# Patient Record
Sex: MALE | Race: WHITE | Employment: OTHER | URBAN - METROPOLITAN AREA
[De-identification: names, ages, dates, MRNs, and addresses within clinical notes are randomized per-mention and may not be internally consistent; named-entity substitution may affect disease eponyms.]

---

## 2017-01-08 ENCOUNTER — APPOINTMENT (OUTPATIENT)
Dept: GENERAL RADIOLOGY | Age: 82
DRG: 871 | End: 2017-01-08
Attending: EMERGENCY MEDICINE
Payer: MEDICARE

## 2017-01-08 ENCOUNTER — HOSPITAL ENCOUNTER (INPATIENT)
Age: 82
LOS: 2 days | Discharge: NURSING, REHAB OR LONG-TERM CARE | DRG: 871 | End: 2017-01-11
Attending: EMERGENCY MEDICINE | Admitting: INTERNAL MEDICINE
Payer: MEDICARE

## 2017-01-08 ENCOUNTER — APPOINTMENT (OUTPATIENT)
Dept: CT IMAGING | Age: 82
DRG: 871 | End: 2017-01-08
Attending: EMERGENCY MEDICINE
Payer: MEDICARE

## 2017-01-08 DIAGNOSIS — J21.8 ACUTE VIRAL BRONCHIOLITIS: ICD-10-CM

## 2017-01-08 DIAGNOSIS — J96.01 ACUTE RESPIRATORY FAILURE WITH HYPOXEMIA (HCC): ICD-10-CM

## 2017-01-08 DIAGNOSIS — E11.9 DIABETES MELLITUS TYPE 2, INSULIN DEPENDENT (HCC): ICD-10-CM

## 2017-01-08 DIAGNOSIS — J96.01 ACUTE RESPIRATORY FAILURE WITH HYPOXIA (HCC): ICD-10-CM

## 2017-01-08 DIAGNOSIS — J10.1 INFLUENZA A: Primary | ICD-10-CM

## 2017-01-08 DIAGNOSIS — I48.91 ATRIAL FIBRILLATION WITH RVR (HCC): ICD-10-CM

## 2017-01-08 DIAGNOSIS — Z79.4 DIABETES MELLITUS TYPE 2, INSULIN DEPENDENT (HCC): ICD-10-CM

## 2017-01-08 DIAGNOSIS — A41.9 SEVERE SEPSIS (HCC): ICD-10-CM

## 2017-01-08 DIAGNOSIS — B97.89 ACUTE VIRAL BRONCHIOLITIS: ICD-10-CM

## 2017-01-08 DIAGNOSIS — R65.20 SEVERE SEPSIS (HCC): ICD-10-CM

## 2017-01-08 LAB
ALBUMIN SERPL BCP-MCNC: 3 G/DL (ref 3.2–4.6)
ALBUMIN/GLOB SERPL: 0.9 {RATIO} (ref 1.2–3.5)
ALP SERPL-CCNC: 68 U/L (ref 50–136)
ALT SERPL-CCNC: 34 U/L (ref 12–65)
AMORPH CRY URNS QL MICRO: NORMAL
ANION GAP BLD CALC-SCNC: 9 MMOL/L (ref 7–16)
AST SERPL W P-5'-P-CCNC: 31 U/L (ref 15–37)
BACTERIA URNS QL MICRO: NORMAL /HPF
BASOPHILS # BLD AUTO: 0 K/UL (ref 0–0.2)
BASOPHILS # BLD: 0 % (ref 0–2)
BILIRUB SERPL-MCNC: 0.4 MG/DL (ref 0.2–1.1)
BUN SERPL-MCNC: 18 MG/DL (ref 8–23)
CALCIUM SERPL-MCNC: 8.5 MG/DL (ref 8.3–10.4)
CASTS URNS QL MICRO: NORMAL /LPF
CHLORIDE SERPL-SCNC: 102 MMOL/L (ref 98–107)
CO2 SERPL-SCNC: 25 MMOL/L (ref 21–32)
CREAT SERPL-MCNC: 1.47 MG/DL (ref 0.8–1.5)
CRYSTALS URNS QL MICRO: 0 /LPF
DIFFERENTIAL METHOD BLD: ABNORMAL
EOSINOPHIL # BLD: 0 K/UL (ref 0–0.8)
EOSINOPHIL NFR BLD: 0 % (ref 0.5–7.8)
EPI CELLS #/AREA URNS HPF: NORMAL /HPF
ERYTHROCYTE [DISTWIDTH] IN BLOOD BY AUTOMATED COUNT: 13.6 % (ref 11.9–14.6)
FLUAV AG NPH QL IA: POSITIVE
FLUBV AG NPH QL IA: NEGATIVE
GLOBULIN SER CALC-MCNC: 3.3 G/DL (ref 2.3–3.5)
GLUCOSE SERPL-MCNC: 132 MG/DL (ref 65–100)
HCT VFR BLD AUTO: 38.6 % (ref 41.1–50.3)
HGB BLD-MCNC: 12.8 G/DL (ref 13.6–17.2)
IMM GRANULOCYTES # BLD: 0 K/UL (ref 0–0.5)
IMM GRANULOCYTES NFR BLD AUTO: 0.4 % (ref 0–5)
INR PPP: 1.1 (ref 0.9–1.2)
LYMPHOCYTES # BLD AUTO: 7 % (ref 13–44)
LYMPHOCYTES # BLD: 0.8 K/UL (ref 0.5–4.6)
MCH RBC QN AUTO: 29.3 PG (ref 26.1–32.9)
MCHC RBC AUTO-ENTMCNC: 33.2 G/DL (ref 31.4–35)
MCV RBC AUTO: 88.3 FL (ref 79.6–97.8)
MONOCYTES # BLD: 1 K/UL (ref 0.1–1.3)
MONOCYTES NFR BLD AUTO: 10 % (ref 4–12)
MUCOUS THREADS URNS QL MICRO: 0 /LPF
NEUTS SEG # BLD: 9.1 K/UL (ref 1.7–8.2)
NEUTS SEG NFR BLD AUTO: 83 % (ref 43–78)
PLATELET # BLD AUTO: 160 K/UL (ref 150–450)
PMV BLD AUTO: 8.8 FL (ref 10.8–14.1)
POTASSIUM SERPL-SCNC: 3.9 MMOL/L (ref 3.5–5.1)
PROT SERPL-MCNC: 6.3 G/DL (ref 6.3–8.2)
PROTHROMBIN TIME: 12.5 SEC (ref 9.6–12)
RBC # BLD AUTO: 4.37 M/UL (ref 4.23–5.67)
RBC #/AREA URNS HPF: NORMAL /HPF
SODIUM SERPL-SCNC: 136 MMOL/L (ref 136–145)
WBC # BLD AUTO: 10.9 K/UL (ref 4.3–11.1)
WBC URNS QL MICRO: NORMAL /HPF

## 2017-01-08 PROCEDURE — 80053 COMPREHEN METABOLIC PANEL: CPT | Performed by: EMERGENCY MEDICINE

## 2017-01-08 PROCEDURE — 74011250636 HC RX REV CODE- 250/636: Performed by: EMERGENCY MEDICINE

## 2017-01-08 PROCEDURE — 96374 THER/PROPH/DIAG INJ IV PUSH: CPT | Performed by: EMERGENCY MEDICINE

## 2017-01-08 PROCEDURE — 81015 MICROSCOPIC EXAM OF URINE: CPT | Performed by: EMERGENCY MEDICINE

## 2017-01-08 PROCEDURE — 87804 INFLUENZA ASSAY W/OPTIC: CPT | Performed by: EMERGENCY MEDICINE

## 2017-01-08 PROCEDURE — 77030011943: Performed by: EMERGENCY MEDICINE

## 2017-01-08 PROCEDURE — 87040 BLOOD CULTURE FOR BACTERIA: CPT | Performed by: EMERGENCY MEDICINE

## 2017-01-08 PROCEDURE — 83605 ASSAY OF LACTIC ACID: CPT

## 2017-01-08 PROCEDURE — 85610 PROTHROMBIN TIME: CPT | Performed by: EMERGENCY MEDICINE

## 2017-01-08 PROCEDURE — 85025 COMPLETE CBC W/AUTO DIFF WBC: CPT | Performed by: EMERGENCY MEDICINE

## 2017-01-08 PROCEDURE — 93005 ELECTROCARDIOGRAM TRACING: CPT | Performed by: INTERNAL MEDICINE

## 2017-01-08 PROCEDURE — 71020 XR CHEST PA LAT: CPT

## 2017-01-08 PROCEDURE — 70450 CT HEAD/BRAIN W/O DYE: CPT

## 2017-01-08 PROCEDURE — 51701 INSERT BLADDER CATHETER: CPT | Performed by: EMERGENCY MEDICINE

## 2017-01-08 PROCEDURE — 99285 EMERGENCY DEPT VISIT HI MDM: CPT | Performed by: EMERGENCY MEDICINE

## 2017-01-08 PROCEDURE — 84145 PROCALCITONIN (PCT): CPT | Performed by: EMERGENCY MEDICINE

## 2017-01-08 PROCEDURE — 96361 HYDRATE IV INFUSION ADD-ON: CPT | Performed by: EMERGENCY MEDICINE

## 2017-01-08 RX ORDER — GLUCOSAMINE SULFATE 1500 MG
POWDER IN PACKET (EA) ORAL
COMMUNITY
End: 2017-02-07

## 2017-01-08 RX ORDER — INSULIN GLARGINE 100 [IU]/ML
24 INJECTION, SOLUTION SUBCUTANEOUS
COMMUNITY

## 2017-01-08 RX ORDER — BACLOFEN 20 MG
250 TABLET ORAL
COMMUNITY

## 2017-01-08 RX ORDER — IPRATROPIUM BROMIDE AND ALBUTEROL SULFATE 2.5; .5 MG/3ML; MG/3ML
3 SOLUTION RESPIRATORY (INHALATION)
Status: COMPLETED | OUTPATIENT
Start: 2017-01-08 | End: 2017-01-09

## 2017-01-08 RX ORDER — INSULIN LISPRO 100 [IU]/ML
5 INJECTION, SOLUTION INTRAVENOUS; SUBCUTANEOUS
COMMUNITY

## 2017-01-08 RX ORDER — ACETAMINOPHEN 325 MG/1
500 TABLET ORAL
COMMUNITY

## 2017-01-08 RX ADMIN — SODIUM CHLORIDE 1000 ML: 900 INJECTION, SOLUTION INTRAVENOUS at 22:07

## 2017-01-09 PROBLEM — I48.91 ATRIAL FIBRILLATION WITH RVR (HCC): Status: ACTIVE | Noted: 2017-01-09

## 2017-01-09 PROBLEM — J09.X2 INFLUENZA A (H5N1): Status: ACTIVE | Noted: 2017-01-09

## 2017-01-09 PROBLEM — A41.9 SEVERE SEPSIS (HCC): Status: ACTIVE | Noted: 2017-01-09

## 2017-01-09 PROBLEM — J96.01 ACUTE RESPIRATORY FAILURE WITH HYPOXEMIA (HCC): Status: ACTIVE | Noted: 2017-01-09

## 2017-01-09 PROBLEM — E78.5 DYSLIPIDEMIA: Chronic | Status: ACTIVE | Noted: 2017-01-09

## 2017-01-09 PROBLEM — Z79.4 DIABETES MELLITUS TYPE 2, INSULIN DEPENDENT (HCC): Status: ACTIVE | Noted: 2017-01-09

## 2017-01-09 PROBLEM — R65.20 SEVERE SEPSIS (HCC): Status: ACTIVE | Noted: 2017-01-09

## 2017-01-09 PROBLEM — J10.1 INFLUENZA A: Status: ACTIVE | Noted: 2017-01-09

## 2017-01-09 PROBLEM — E11.9 DIABETES MELLITUS TYPE 2, INSULIN DEPENDENT (HCC): Status: ACTIVE | Noted: 2017-01-09

## 2017-01-09 LAB
ANION GAP BLD CALC-SCNC: 12 MMOL/L (ref 7–16)
ANION GAP BLD CALC-SCNC: 9 MMOL/L (ref 7–16)
ARTERIAL PATENCY WRIST A: POSITIVE
ARTERIAL PATENCY WRIST A: POSITIVE
ATRIAL RATE: 144 BPM
ATRIAL RATE: 94 BPM
BACTERIA SPEC CULT: NORMAL
BASE DEFICIT BLDA-SCNC: 4 MMOL/L (ref 0–2)
BASE DEFICIT BLDA-SCNC: 4.9 MMOL/L (ref 0–2)
BASOPHILS # BLD AUTO: 0 K/UL (ref 0–0.2)
BASOPHILS # BLD: 0 % (ref 0–2)
BDY SITE: ABNORMAL
BDY SITE: ABNORMAL
BUN SERPL-MCNC: 16 MG/DL (ref 8–23)
BUN SERPL-MCNC: 19 MG/DL (ref 8–23)
CALCIUM SERPL-MCNC: 8.6 MG/DL (ref 8.3–10.4)
CALCIUM SERPL-MCNC: 8.7 MG/DL (ref 8.3–10.4)
CALCULATED P AXIS, ECG09: 81 DEGREES
CALCULATED P AXIS, ECG09: NORMAL DEGREES
CALCULATED R AXIS, ECG10: 29 DEGREES
CALCULATED R AXIS, ECG10: 43 DEGREES
CALCULATED T AXIS, ECG11: 58 DEGREES
CALCULATED T AXIS, ECG11: 58 DEGREES
CHLORIDE SERPL-SCNC: 103 MMOL/L (ref 98–107)
CHLORIDE SERPL-SCNC: 104 MMOL/L (ref 98–107)
CO2 SERPL-SCNC: 21 MMOL/L (ref 21–32)
CO2 SERPL-SCNC: 24 MMOL/L (ref 21–32)
COHGB MFR BLD: 0.2 % (ref 0.5–1.5)
COHGB MFR BLD: 0.2 % (ref 0.5–1.5)
CREAT SERPL-MCNC: 1.02 MG/DL (ref 0.8–1.5)
CREAT SERPL-MCNC: 1.35 MG/DL (ref 0.8–1.5)
DIAGNOSIS, 93000: NORMAL
DIAGNOSIS, 93000: NORMAL
DIASTOLIC BP, ECG02: NORMAL MMHG
DIASTOLIC BP, ECG02: NORMAL MMHG
DIFFERENTIAL METHOD BLD: ABNORMAL
DO-HGB BLD-MCNC: 6 % (ref 0–5)
DO-HGB BLD-MCNC: 8 % (ref 0–5)
EOSINOPHIL # BLD: 0 K/UL (ref 0–0.8)
EOSINOPHIL NFR BLD: 0 % (ref 0.5–7.8)
ERYTHROCYTE [DISTWIDTH] IN BLOOD BY AUTOMATED COUNT: 13.7 % (ref 11.9–14.6)
FIO2 ON VENT: 55 %
GAS FLOW.O2 O2 DELIVERY SYS: 50 L/MIN
GLUCOSE BLD STRIP.AUTO-MCNC: 153 MG/DL (ref 65–100)
GLUCOSE BLD STRIP.AUTO-MCNC: 191 MG/DL (ref 65–100)
GLUCOSE BLD STRIP.AUTO-MCNC: 204 MG/DL (ref 65–100)
GLUCOSE BLD STRIP.AUTO-MCNC: 215 MG/DL (ref 65–100)
GLUCOSE BLD STRIP.AUTO-MCNC: 217 MG/DL (ref 65–100)
GLUCOSE BLD STRIP.AUTO-MCNC: 52 MG/DL (ref 65–100)
GLUCOSE BLD STRIP.AUTO-MCNC: 53 MG/DL (ref 65–100)
GLUCOSE SERPL-MCNC: 197 MG/DL (ref 65–100)
GLUCOSE SERPL-MCNC: 201 MG/DL (ref 65–100)
HCO3 BLDA-SCNC: 21 MMOL/L (ref 22–26)
HCO3 BLDA-SCNC: 22 MMOL/L (ref 22–26)
HCT VFR BLD AUTO: 38.9 % (ref 41.1–50.3)
HGB BLD-MCNC: 12.8 G/DL (ref 13.6–17.2)
HGB BLDMV-MCNC: 13.6 GM/DL (ref 11.7–15)
HGB BLDMV-MCNC: 13.6 GM/DL (ref 11.7–15)
IMM GRANULOCYTES # BLD: 0 K/UL (ref 0–0.5)
IMM GRANULOCYTES NFR BLD AUTO: 0.3 % (ref 0–5)
LACTATE BLD-SCNC: 1.3 MMOL/L (ref 0.5–1.9)
LACTATE SERPL-SCNC: 1.1 MMOL/L (ref 0.4–2)
LYMPHOCYTES # BLD AUTO: 6 % (ref 13–44)
LYMPHOCYTES # BLD: 0.6 K/UL (ref 0.5–4.6)
MAGNESIUM SERPL-MCNC: 2.2 MG/DL (ref 1.8–2.4)
MCH RBC QN AUTO: 29.4 PG (ref 26.1–32.9)
MCHC RBC AUTO-ENTMCNC: 32.9 G/DL (ref 31.4–35)
MCV RBC AUTO: 89.4 FL (ref 79.6–97.8)
METHGB MFR BLD: 0.3 % (ref 0–1.5)
METHGB MFR BLD: 0.7 % (ref 0–1.5)
MONOCYTES # BLD: 0.6 K/UL (ref 0.1–1.3)
MONOCYTES NFR BLD AUTO: 6 % (ref 4–12)
NEUTS SEG # BLD: 9.4 K/UL (ref 1.7–8.2)
NEUTS SEG NFR BLD AUTO: 88 % (ref 43–78)
OXYHGB MFR BLDA: 91.6 % (ref 94–97)
OXYHGB MFR BLDA: 93.7 % (ref 94–97)
P-R INTERVAL, ECG05: 160 MS
P-R INTERVAL, ECG05: NORMAL MS
PCO2 BLDA: 41 MMHG (ref 35–45)
PCO2 BLDA: 41 MMHG (ref 35–45)
PH BLDA: 7.32 [PH] (ref 7.35–7.45)
PH BLDA: 7.34 [PH] (ref 7.35–7.45)
PLATELET # BLD AUTO: 166 K/UL (ref 150–450)
PMV BLD AUTO: 8.8 FL (ref 10.8–14.1)
PO2 BLDA: 64 MMHG (ref 75–100)
PO2 BLDA: 76 MMHG (ref 75–100)
POTASSIUM SERPL-SCNC: 3.9 MMOL/L (ref 3.5–5.1)
POTASSIUM SERPL-SCNC: 4.1 MMOL/L (ref 3.5–5.1)
PROCALCITONIN SERPL-MCNC: 0.3 NG/ML
Q-T INTERVAL, ECG07: 292 MS
Q-T INTERVAL, ECG07: 324 MS
QRS DURATION, ECG06: 78 MS
QRS DURATION, ECG06: 82 MS
QTC CALCULATION (BEZET), ECG08: 405 MS
QTC CALCULATION (BEZET), ECG08: 450 MS
RBC # BLD AUTO: 4.35 M/UL (ref 4.23–5.67)
SAO2 % BLD: 92 % (ref 92–98.5)
SAO2 % BLD: 94 % (ref 92–98.5)
SERVICE CMNT-IMP: ABNORMAL
SERVICE CMNT-IMP: NORMAL
SODIUM SERPL-SCNC: 136 MMOL/L (ref 136–145)
SODIUM SERPL-SCNC: 137 MMOL/L (ref 136–145)
SYSTOLIC BP, ECG01: NORMAL MMHG
SYSTOLIC BP, ECG01: NORMAL MMHG
TROPONIN I SERPL-MCNC: 0.04 NG/ML (ref 0.02–0.05)
TSH SERPL DL<=0.005 MIU/L-ACNC: 0.52 UIU/ML (ref 0.36–3.74)
VENTILATION MODE VENT: ABNORMAL
VENTILATION MODE VENT: ABNORMAL
VENTRICULAR RATE, ECG03: 143 BPM
VENTRICULAR RATE, ECG03: 94 BPM
WBC # BLD AUTO: 10.6 K/UL (ref 4.3–11.1)

## 2017-01-09 PROCEDURE — 74011000250 HC RX REV CODE- 250: Performed by: INTERNAL MEDICINE

## 2017-01-09 PROCEDURE — 74011250637 HC RX REV CODE- 250/637: Performed by: INTERNAL MEDICINE

## 2017-01-09 PROCEDURE — 74011250637 HC RX REV CODE- 250/637: Performed by: EMERGENCY MEDICINE

## 2017-01-09 PROCEDURE — 85025 COMPLETE CBC W/AUTO DIFF WBC: CPT | Performed by: INTERNAL MEDICINE

## 2017-01-09 PROCEDURE — 80048 BASIC METABOLIC PNL TOTAL CA: CPT | Performed by: INTERNAL MEDICINE

## 2017-01-09 PROCEDURE — 74011250636 HC RX REV CODE- 250/636: Performed by: INTERNAL MEDICINE

## 2017-01-09 PROCEDURE — 77030019605

## 2017-01-09 PROCEDURE — 84484 ASSAY OF TROPONIN QUANT: CPT | Performed by: INTERNAL MEDICINE

## 2017-01-09 PROCEDURE — 82803 BLOOD GASES ANY COMBINATION: CPT

## 2017-01-09 PROCEDURE — 94640 AIRWAY INHALATION TREATMENT: CPT

## 2017-01-09 PROCEDURE — 36600 WITHDRAWAL OF ARTERIAL BLOOD: CPT

## 2017-01-09 PROCEDURE — 87641 MR-STAPH DNA AMP PROBE: CPT | Performed by: INTERNAL MEDICINE

## 2017-01-09 PROCEDURE — 83605 ASSAY OF LACTIC ACID: CPT | Performed by: INTERNAL MEDICINE

## 2017-01-09 PROCEDURE — 74011250637 HC RX REV CODE- 250/637: Performed by: PHYSICIAN ASSISTANT

## 2017-01-09 PROCEDURE — 36415 COLL VENOUS BLD VENIPUNCTURE: CPT | Performed by: INTERNAL MEDICINE

## 2017-01-09 PROCEDURE — 93306 TTE W/DOPPLER COMPLETE: CPT

## 2017-01-09 PROCEDURE — 83735 ASSAY OF MAGNESIUM: CPT | Performed by: INTERNAL MEDICINE

## 2017-01-09 PROCEDURE — 74011000250 HC RX REV CODE- 250: Performed by: EMERGENCY MEDICINE

## 2017-01-09 PROCEDURE — 74011250636 HC RX REV CODE- 250/636: Performed by: EMERGENCY MEDICINE

## 2017-01-09 PROCEDURE — 99223 1ST HOSP IP/OBS HIGH 75: CPT | Performed by: INTERNAL MEDICINE

## 2017-01-09 PROCEDURE — 74011636637 HC RX REV CODE- 636/637: Performed by: INTERNAL MEDICINE

## 2017-01-09 PROCEDURE — 93005 ELECTROCARDIOGRAM TRACING: CPT | Performed by: INTERNAL MEDICINE

## 2017-01-09 PROCEDURE — 77010033711 HC HIGH FLOW OXYGEN

## 2017-01-09 PROCEDURE — 84443 ASSAY THYROID STIM HORMONE: CPT | Performed by: INTERNAL MEDICINE

## 2017-01-09 PROCEDURE — 82962 GLUCOSE BLOOD TEST: CPT

## 2017-01-09 PROCEDURE — 65620000000 HC RM CCU GENERAL

## 2017-01-09 RX ORDER — HALOPERIDOL 5 MG/ML
5 INJECTION INTRAMUSCULAR
Status: DISCONTINUED | OUTPATIENT
Start: 2017-01-09 | End: 2017-01-11 | Stop reason: HOSPADM

## 2017-01-09 RX ORDER — SODIUM CHLORIDE 0.9 % (FLUSH) 0.9 %
5-10 SYRINGE (ML) INJECTION EVERY 8 HOURS
Status: DISCONTINUED | OUTPATIENT
Start: 2017-01-09 | End: 2017-01-11 | Stop reason: HOSPADM

## 2017-01-09 RX ORDER — LANOLIN ALCOHOL/MO/W.PET/CERES
200 CREAM (GRAM) TOPICAL DAILY
Status: DISCONTINUED | OUTPATIENT
Start: 2017-01-09 | End: 2017-01-11 | Stop reason: HOSPADM

## 2017-01-09 RX ORDER — TAMSULOSIN HYDROCHLORIDE 0.4 MG/1
0.4 CAPSULE ORAL DAILY
Status: DISCONTINUED | OUTPATIENT
Start: 2017-01-09 | End: 2017-01-11 | Stop reason: HOSPADM

## 2017-01-09 RX ORDER — ACETAMINOPHEN 650 MG/1
650 SUPPOSITORY RECTAL
Status: COMPLETED | OUTPATIENT
Start: 2017-01-09 | End: 2017-01-09

## 2017-01-09 RX ORDER — SODIUM CHLORIDE TAB 1 GM 1 G
1 TAB MISCELLANEOUS DAILY
COMMUNITY

## 2017-01-09 RX ORDER — ASPIRIN 325 MG
50000 TABLET, DELAYED RELEASE (ENTERIC COATED) ORAL
COMMUNITY

## 2017-01-09 RX ORDER — QUETIAPINE FUMARATE 25 MG/1
25 TABLET, FILM COATED ORAL ONCE
Status: COMPLETED | OUTPATIENT
Start: 2017-01-09 | End: 2017-01-09

## 2017-01-09 RX ORDER — LISINOPRIL 30 MG/1
30 TABLET ORAL DAILY
COMMUNITY
End: 2017-02-07 | Stop reason: DRUGHIGH

## 2017-01-09 RX ORDER — TRAMADOL HYDROCHLORIDE 50 MG/1
50 TABLET ORAL
Status: DISCONTINUED | OUTPATIENT
Start: 2017-01-09 | End: 2017-01-11 | Stop reason: HOSPADM

## 2017-01-09 RX ORDER — DILTIAZEM HYDROCHLORIDE 30 MG/1
30 TABLET, FILM COATED ORAL EVERY 6 HOURS
Status: DISCONTINUED | OUTPATIENT
Start: 2017-01-09 | End: 2017-01-10

## 2017-01-09 RX ORDER — OSELTAMIVIR PHOSPHATE 75 MG/1
75 CAPSULE ORAL
Status: DISCONTINUED | OUTPATIENT
Start: 2017-01-09 | End: 2017-01-09

## 2017-01-09 RX ORDER — ACETAMINOPHEN 500 MG
500 TABLET ORAL
Status: DISCONTINUED | OUTPATIENT
Start: 2017-01-09 | End: 2017-01-11 | Stop reason: HOSPADM

## 2017-01-09 RX ORDER — DEXTROSE 50 % IN WATER (D50W) INTRAVENOUS SYRINGE
25 ONCE
Status: COMPLETED | OUTPATIENT
Start: 2017-01-09 | End: 2017-01-09

## 2017-01-09 RX ORDER — DILTIAZEM HYDROCHLORIDE 5 MG/ML
10 INJECTION INTRAVENOUS ONCE
Status: COMPLETED | OUTPATIENT
Start: 2017-01-09 | End: 2017-01-09

## 2017-01-09 RX ORDER — SODIUM CHLORIDE 0.9 % (FLUSH) 0.9 %
5-10 SYRINGE (ML) INJECTION AS NEEDED
Status: DISCONTINUED | OUTPATIENT
Start: 2017-01-09 | End: 2017-01-11 | Stop reason: HOSPADM

## 2017-01-09 RX ORDER — CITALOPRAM 20 MG/1
10 TABLET, FILM COATED ORAL DAILY
Status: DISCONTINUED | OUTPATIENT
Start: 2017-01-09 | End: 2017-01-11 | Stop reason: HOSPADM

## 2017-01-09 RX ORDER — OSELTAMIVIR PHOSPHATE 75 MG/1
75 CAPSULE ORAL 2 TIMES DAILY
Status: DISCONTINUED | OUTPATIENT
Start: 2017-01-09 | End: 2017-01-10

## 2017-01-09 RX ORDER — PANTOPRAZOLE SODIUM 40 MG/1
40 TABLET, DELAYED RELEASE ORAL
Status: DISCONTINUED | OUTPATIENT
Start: 2017-01-09 | End: 2017-01-11 | Stop reason: HOSPADM

## 2017-01-09 RX ORDER — ALBUTEROL SULFATE 0.83 MG/ML
2.5 SOLUTION RESPIRATORY (INHALATION)
Status: DISCONTINUED | OUTPATIENT
Start: 2017-01-09 | End: 2017-01-11 | Stop reason: HOSPADM

## 2017-01-09 RX ORDER — ACETAMINOPHEN 650 MG/1
SUPPOSITORY RECTAL
Status: ACTIVE
Start: 2017-01-09 | End: 2017-01-09

## 2017-01-09 RX ORDER — HEPARIN SODIUM 5000 [USP'U]/ML
5000 INJECTION, SOLUTION INTRAVENOUS; SUBCUTANEOUS EVERY 8 HOURS
Status: DISCONTINUED | OUTPATIENT
Start: 2017-01-09 | End: 2017-01-11 | Stop reason: HOSPADM

## 2017-01-09 RX ORDER — ASPIRIN 325 MG
325 TABLET ORAL
Status: DISCONTINUED | OUTPATIENT
Start: 2017-01-09 | End: 2017-01-11 | Stop reason: HOSPADM

## 2017-01-09 RX ORDER — NATEGLINIDE 60 MG/1
60 TABLET ORAL 2 TIMES DAILY
Status: DISCONTINUED | OUTPATIENT
Start: 2017-01-09 | End: 2017-01-11 | Stop reason: HOSPADM

## 2017-01-09 RX ADMIN — METHYLPREDNISOLONE SODIUM SUCCINATE 125 MG: 125 INJECTION, POWDER, FOR SOLUTION INTRAMUSCULAR; INTRAVENOUS at 01:25

## 2017-01-09 RX ADMIN — CITALOPRAM HYDROBROMIDE 10 MG: 20 TABLET ORAL at 09:54

## 2017-01-09 RX ADMIN — ALBUTEROL SULFATE 2.5 MG: 2.5 SOLUTION RESPIRATORY (INHALATION) at 12:28

## 2017-01-09 RX ADMIN — GUAIFENESIN 1200 MG: 600 TABLET, EXTENDED RELEASE ORAL at 18:20

## 2017-01-09 RX ADMIN — HALOPERIDOL LACTATE 5 MG: 5 INJECTION, SOLUTION INTRAMUSCULAR at 21:41

## 2017-01-09 RX ADMIN — INSULIN HUMAN 10 UNITS: 100 INJECTION, SOLUTION PARENTERAL at 18:31

## 2017-01-09 RX ADMIN — ASPIRIN 325 MG ORAL TABLET 325 MG: 325 PILL ORAL at 08:35

## 2017-01-09 RX ADMIN — INSULIN HUMAN 40 UNITS: 100 INJECTION, SUSPENSION SUBCUTANEOUS at 03:38

## 2017-01-09 RX ADMIN — INSULIN HUMAN 5 UNITS: 100 INJECTION, SOLUTION PARENTERAL at 13:16

## 2017-01-09 RX ADMIN — ALBUTEROL SULFATE 2.5 MG: 2.5 SOLUTION RESPIRATORY (INHALATION) at 08:00

## 2017-01-09 RX ADMIN — IPRATROPIUM BROMIDE AND ALBUTEROL SULFATE 3 ML: 2.5; .5 SOLUTION RESPIRATORY (INHALATION) at 00:27

## 2017-01-09 RX ADMIN — DILTIAZEM HYDROCHLORIDE 30 MG: 30 TABLET, FILM COATED ORAL at 14:44

## 2017-01-09 RX ADMIN — PANTOPRAZOLE SODIUM 40 MG: 40 TABLET, DELAYED RELEASE ORAL at 08:36

## 2017-01-09 RX ADMIN — Medication 10 ML: at 21:41

## 2017-01-09 RX ADMIN — GUAIFENESIN 1200 MG: 600 TABLET, EXTENDED RELEASE ORAL at 13:16

## 2017-01-09 RX ADMIN — HEPARIN SODIUM 5000 UNITS: 5000 INJECTION, SOLUTION INTRAVENOUS; SUBCUTANEOUS at 13:16

## 2017-01-09 RX ADMIN — HEPARIN SODIUM 5000 UNITS: 5000 INJECTION, SOLUTION INTRAVENOUS; SUBCUTANEOUS at 21:40

## 2017-01-09 RX ADMIN — ACETAMINOPHEN 650 MG: 650 SUPPOSITORY RECTAL at 01:10

## 2017-01-09 RX ADMIN — ALBUTEROL SULFATE 2.5 MG: 2.5 SOLUTION RESPIRATORY (INHALATION) at 03:46

## 2017-01-09 RX ADMIN — INSULIN HUMAN 40 UNITS: 100 INJECTION, SUSPENSION SUBCUTANEOUS at 16:34

## 2017-01-09 RX ADMIN — OSELTAMIVIR PHOSPHATE 75 MG: 75 CAPSULE ORAL at 18:20

## 2017-01-09 RX ADMIN — Medication 10 ML: at 05:46

## 2017-01-09 RX ADMIN — Medication 200 MG: at 08:37

## 2017-01-09 RX ADMIN — HEPARIN SODIUM 5000 UNITS: 5000 INJECTION, SOLUTION INTRAVENOUS; SUBCUTANEOUS at 05:45

## 2017-01-09 RX ADMIN — ALBUTEROL SULFATE 2.5 MG: 2.5 SOLUTION RESPIRATORY (INHALATION) at 19:59

## 2017-01-09 RX ADMIN — DILTIAZEM HYDROCHLORIDE 30 MG: 30 TABLET, FILM COATED ORAL at 18:20

## 2017-01-09 RX ADMIN — QUETIAPINE FUMARATE 25 MG: 25 TABLET, FILM COATED ORAL at 19:42

## 2017-01-09 RX ADMIN — TAMSULOSIN HYDROCHLORIDE 0.4 MG: 0.4 CAPSULE ORAL at 08:36

## 2017-01-09 RX ADMIN — DEXTROSE MONOHYDRATE 25 G: 25 INJECTION, SOLUTION INTRAVENOUS at 23:30

## 2017-01-09 RX ADMIN — INSULIN HUMAN 10 UNITS: 100 INJECTION, SOLUTION PARENTERAL at 06:09

## 2017-01-09 RX ADMIN — DILTIAZEM HYDROCHLORIDE 30 MG: 30 TABLET, FILM COATED ORAL at 23:03

## 2017-01-09 RX ADMIN — DILTIAZEM HYDROCHLORIDE 10 MG: 5 INJECTION INTRAVENOUS at 13:15

## 2017-01-09 RX ADMIN — Medication 10 ML: at 13:32

## 2017-01-09 RX ADMIN — OSELTAMIVIR PHOSPHATE 75 MG: 75 CAPSULE ORAL at 08:36

## 2017-01-09 NOTE — H&P
HISTORY AND PHYSICAL      Ann Doty    1/9/2017    Date of Admission:  1/8/2017    The patient's chart is reviewed and the patient is discussed with the staff. Subjective:     Patient is a [de-identified] y.o.  male admitted to the ICU from the emergency department with influenza a and respiratory failure. History is obtained predominantly from Dr. Sarthak Muñoz and from the patient's daughter. He currently is in a nursing home along with his wife who herself has severe dementia. Although the patient is somewhat physically limited by a previous hip fracture, the daughter states that his mentation has been excellent. However, the nursing home called her in approximately 10 this morning does state that he \"was not acting right. \"  When the daughter got there is somewhat later, there was obvious \"rattling\" in his chest.  He was transported here and now is minimally responsive. He is a DNR status, and this is maintained upon admission. Review of Systems  A comprehensive review of systems was negative except for that written in the HPI. 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 Taking?   acetaminophen (TYLENOL) 325 mg tablet   Yes Yes   Sig: Take 500 mg by mouth every four (4) hours as needed for Pain. aspirin (ASPIRIN) 325 mg tablet   Yes Yes   Sig: Take 325 mg by mouth every morning.  Stopping beginning 12/15/12. calcium-cholecalciferol, d3, (CALCIUM 600 + D) 600-125 mg-unit tab   Yes Yes   Sig: Take 600 mg by mouth. cholecalciferol (VITAMIN D3) 1,000 unit cap   Yes Yes   Sig: Take  by mouth every seven (7) days. citalopram (CELEXA) 20 mg tablet   Yes Yes   Sig: Take 10 mg by mouth daily. insulin glargine (LANTUS) 100 unit/mL injection   Yes Yes   Si Units by SubCUTAneous route nightly. insulin lispro (HUMALOG) 100 unit/mL injection   Yes Yes   Si Units by SubCUTAneous route.   magnesium oxide 500 mg tab   Yes Yes   Sig: Take 250 mg by mouth.   multivitamin,tx-iron-ca-min (THERA-M) 27-0.4 mg tab   Yes Yes   Sig: Take 1 Tab by mouth daily. nateglinide (STARLIX) 60 mg tablet   Yes Yes   Sig: Take 60 mg by mouth two (2) times a day. omeprazole (PRILOSEC) 20 mg capsule   Yes Yes   Sig: Take 20 mg by mouth daily. tamsulosin (FLOMAX) 0.4 mg capsule   No Yes   Sig: take 1 capsule by mouth once daily   traMADol (ULTRAM) 50 mg tablet   No Yes   Sig: Take 1 Tab by mouth every six (6) hours as needed. Max Daily Amount: 200 mg. Facility-Administered Medications: None       Past Medical History   Diagnosis Date    Femoral neck fracture ([facility] [address] 2015     Right hip fracture ORIF     Past Surgical History   Procedure Laterality Date    Hx appendectomy      Hx hemorrhoidectomy      Hx cataract removal Bilateral      bilateral with lens implants    Hx hip fracture tx Right 2015    Hx rotator cuff repair Right      Social History     Social History    Marital status:      Spouse name: N/A    Number of children: N/A    Years of education: N/A     Occupational History    Not on file.      Social History Main Topics    Smoking status: Former Smoker     Packs/day: 0.50     Years: 6.00    Smokeless tobacco: Current User      Comment: stopped smoking in / chewed tobacco since     Alcohol use No    Drug use: No    Sexual activity: Not on file     Other Topics Concern    Not on file     Social History Narrative    The patient currently resides in a nursing home where his wife is as well. Family History   Problem Relation Age of Onset    Heart Disease Mother      Allergies   Allergen Reactions    Pcn [Penicillins] Anaphylaxis     Throat swelled after taking. Current Facility-Administered Medications   Medication Dose Route Frequency    oseltamivir (TAMIFLU) capsule 75 mg  75 mg Oral NOW    acetaminophen (TYLENOL) 650 mg suppository         Current Outpatient Prescriptions   Medication Sig    calcium-cholecalciferol, d3, (CALCIUM 600 + D) 600-125 mg-unit tab Take 600 mg by mouth.  insulin lispro (HUMALOG) 100 unit/mL injection 100 Units by SubCUTAneous route.  insulin glargine (LANTUS) 100 unit/mL injection 100 Units by SubCUTAneous route nightly.  magnesium oxide 500 mg tab Take 250 mg by mouth.  multivitamin,tx-iron-ca-min (THERA-M) 27-0.4 mg tab Take 1 Tab by mouth daily.  cholecalciferol (VITAMIN D3) 1,000 unit cap Take  by mouth every seven (7) days.  acetaminophen (TYLENOL) 325 mg tablet Take 500 mg by mouth every four (4) hours as needed for Pain.  traMADol (ULTRAM) 50 mg tablet Take 1 Tab by mouth every six (6) hours as needed. Max Daily Amount: 200 mg.    omeprazole (PRILOSEC) 20 mg capsule Take 20 mg by mouth daily.  citalopram (CELEXA) 20 mg tablet Take 10 mg by mouth daily.  tamsulosin (FLOMAX) 0.4 mg capsule take 1 capsule by mouth once daily    aspirin (ASPIRIN) 325 mg tablet Take 325 mg by mouth every morning. Stopping beginning 12/15/12.  nateglinide (STARLIX) 60 mg tablet Take 60 mg by mouth two (2) times a day.          Objective:     Vitals:    01/09/17 0054 01/09/17 0100 01/09/17 0101 01/09/17 0120   BP:  150/68  140/61   Pulse:       Resp:       Temp:    100.4 °F (38 °C)   SpO2: (!) 87%  94% 93%   Weight:       Height:           PHYSICAL EXAM     Constitutional:  the patient is essentially obtunded and on OptiFlow at 80%.  EENMT:  Sclera clear, pupils equal, oral mucosa moist  Respiratory: There are diffuse coarse rhonchi bilaterally. Cardiovascular:  RRR without M,G,R  Gastrointestinal: soft and non-tender; with positive bowel sounds. Musculoskeletal: warm without cyanosis. There is no lower leg edema. Skin:  no jaundice or rashes  Neurologic: no gross neuro deficits other than his obtundation. Psychiatric:  Unable to assess at this time. Chest Xray:      PA and lateral chest x-ray done in the emergency department shows previously noted and unchanged chronic prominence of the bronchovascular markings. Recent Labs      01/08/17   2250   WBC  10.9   HGB  12.8*   HCT  38.6*   PLT  160   INR  1.1     Recent Labs      01/08/17   2250   NA  136   K  3.9   CL  102   GLU  132*   CO2  25   BUN  18   CREA  1.47   CA  8.5   ALB  3.0*   TBILI  0.4   ALT  34   SGOT  31     Recent Labs      01/09/17   0104   PH  7.32*   PCO2  41   PO2  76   HCO3  21*        Ref. Range 1/8/2017 22:50   Procalcitonin   Latest Units: ng/mL 0.3       Assessment:  (Medical Decision Making)     Hospital Problems  Date Reviewed: 1/9/2017          Codes Class Noted POA    Diabetes mellitus type 2, insulin dependent ([facility] [address]  She will be switched from his long-acting insulin to NPH every 12 hours along with a sliding scale. ICD-10-CM: E11.9, Z79.4  ICD-9-CM: 250.00, V58.67  1/9/2017 Yes        Acute respiratory failure with hypoxemia [facility]) ICD-10-CM: J96.01  ICD-9-CM: 518.81  1/9/2017 Yes    Influenza A  Although the patient's initial chest x-ray is unremarkable, he has considerable accretions present in his airways. ICD-10-CM: J10.1  ICD-9-CM: 487.1  1/9/2017 Yes        Severe sepsis ([facility] [address]  He will be started on Tamiflu.    ICD-10-CM: A41.9, R65.20  ICD-9-CM: 038.9, 995.92  1/9/2017 Yes       Plan:  (Medical Decision Making)     Will admit for further medical management      More than 50% of the time documented was spent in face-to-face contact with the patient and in the care of the patient on the floor/unit where the patient is located.     Ines Islas MD

## 2017-01-09 NOTE — PROGRESS NOTES
Bedside and Verbal shift change report given to Ambreen Hernandes RN (oncoming nurse) by Korin Edmonds RN (offgoing nurse). Report included the following information SBAR, Kardex, ED Summary, Intake/Output, MAR, Recent Results and Cardiac Rhythm NSR. Patient more alert this AM, remains only oriented to person.

## 2017-01-09 NOTE — ED TRIAGE NOTES
Pt arrives via [facility] complaining of AMS and potential sepsis. Pt is at [facility] and was last seen normal last night. Per Pt's daughter and POA states that the patient has been altered all day today and family decided to call 911 tonight. Pt sounds wet in both lungs in triage. Pt received a tylenol at [facility] around 7:00 pm per family. Pt's family thought the patient had a stroke, but per the facility they thought he was septic and it was decided to send him here. Pt received 1 gram of rocephin,  600 ccs of fluid in route and patient on 15 L of O2 in route.

## 2017-01-09 NOTE — PROGRESS NOTES
New onset rapid a-fib on monitor. Pt previously in [facility] Pt denying any complaints. Dr. Nay Valdez notified and new orders received.

## 2017-01-09 NOTE — PROGRESS NOTES
Bedside verbal report received from [place], [facility]. Gtts and lines verified and chart reviewed. Care assumed of patient. Skin assessed. Pt alert and oriented x1 (name only). Pt with hx of dementia. Pt is pleasantly confused and cooperative at this time. Pt follows most commands as attention span and concentration is limited. Pt denying any pain, SOB, n/v, at this time. Rass=0. LS  coarse. Additional Findings:    [place]: none. NGT: none    Drains: none    Lines: peripheral x2. Flushed. No signs of infiltration.

## 2017-01-09 NOTE — ROUTINE PROCESS
TRANSFER - OUT REPORT:    Verbal report given to Callie [address] on Barry Purcell  being transferred to [facility](unit) for progression of care. Report consisted of patients Situation, Background, Assessment and   Recommendations(SBAR). Information from the following report(s) ER summary was reviewed with the receiving nurse. Opportunity for questions and clarification was provided.       Patient transported with:   RN and monitor

## 2017-01-09 NOTE — ED PROVIDER NOTES
HPI Comments: Patient presents to the ER via EMS for reported altered mental status. He currently resides at a nursing facility and they have noticed some increased confusion and lethargy today. Patient reportedly had a fall approximately 2 days ago. There was some reported head trauma but no loss of consciousness. They have also noticed some increased difficulty breathing and coughed this evening. He also was found to have a fever today. Patient is a [de-identified] y.o. male presenting with altered mental status. The history is provided by the patient, the nursing home and a relative. The history is limited by the condition of the patient. Altered mental status    This is a new problem. The current episode started yesterday. The problem has not changed since onset. Associated symptoms include confusion and unresponsiveness. Pertinent negatives include no weakness, no hallucinations and no tingling. Mental status baseline is moderate dementia. Past Medical History:   Diagnosis Date    . 6/3/2015    BPH (benign prostatic hypertrophy) 6/3/2015    Cardiac dysrhythmia, unspecified 6/3/2015    Chronic lymphocytic thyroiditis 6/3/2015    Depressive disorder, not elsewhere classified 6/3/2015    ED (erectile dysfunction) 6/3/2015    Hypertension      managed with med. 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 [address] Dx. approx.  2002     Oral hypoglycemics/ Avg -130/ no S/S low BS       Past Surgical History:   Procedure Laterality Date    Hx appendectomy      Hx hemorrhoidectomy      Hx cataract removal       bilateral with lens implants    Hx orthopaedic       hip fx         Family History:   Problem Relation Age of Onset    Heart Disease Mother        Social History     Social History    Marital status:      Spouse name: N/A    Number of children: N/A    Years of education: N/A     Occupational History    Not on file. Social History Main Topics    Smoking status: Former Smoker     Packs/day: 0.50     Years: 6.00    Smokeless tobacco: Current User      Comment: stopped smoking in 1953/ chewed tobacco since 1953    Alcohol use No    Drug use: No    Sexual activity: Not on file     Other Topics Concern    Not on file     Social History Narrative         ALLERGIES: Pcn [penicillins]    Review of Systems   Unable to perform ROS: Dementia   Constitutional: Positive for fatigue and fever. Respiratory: Positive for cough. Neurological: Negative for tingling and weakness. Psychiatric/Behavioral: Positive for confusion. Negative for hallucinations. Vitals:    01/08/17 2151   BP: 132/64   Pulse: 98   Resp: 24   Temp: (!) 102.5 °F (39.2 °C)   SpO2: 93%   Weight: 86.2 kg (190 lb)   Height: 5' 10\" (1.778 m)            Physical Exam   Constitutional: He appears well-developed and well-nourished. HENT:   Head: Normocephalic. Mouth/Throat: Oropharynx is clear and moist.   Eyes: Conjunctivae and EOM are normal. Pupils are equal, round, and reactive to light. Neck: Normal range of motion. Neck supple. No tracheal deviation present. No thyromegaly present. Cardiovascular: Normal rate, regular rhythm and normal heart sounds. Exam reveals no friction rub. No murmur heard. Pulmonary/Chest: Effort normal. No respiratory distress. He has wheezes. He has rhonchi. Abdominal: Soft. Bowel sounds are normal. He exhibits no distension. Musculoskeletal: Normal range of motion. He exhibits no edema or deformity. Neurological: He is alert. Skin: Skin is warm and dry. No rash noted. No erythema. Nursing note and vitals reviewed.        MDM  Number of Diagnoses or Management Options  Acute respiratory failure with hypoxia ([facility] [address]):   Acute viral bronchiolitis:   Influenza A:   Diagnosis management comments: DDx: Pneumonia/ Sepsis/ Traumatic Brain Injury/ UTI/ Influenza    12:42 AM  Influenza A+  Normal labs and head CT but still remains altered and hypoxic with rhonchi  Will treat with nebs and NT suctioning    1:03 AM  Oxygen saturation somewhat improved with NTG suctioning, and high flow oxygen  Will d/w pulmonary for admission       Amount and/or Complexity of Data Reviewed  Clinical lab tests: ordered and reviewed  Tests in the radiology section of CPT®: ordered and reviewed    Risk of Complications, Morbidity, and/or Mortality  Presenting problems: high  Diagnostic procedures: moderate  Management options: moderate    Critical Care  Total time providing critical care: 30-74 minutes (Critical Care Time 60 Minutes: Excluding all billable procedures, time spent at the bedside directing patients resuscitation related to his hypoxia and respiratory failure, updating family, and coordinating care with Pulmonary  )    Patient Progress  Patient progress: stable    ED Course       Procedures      Results Include:    Recent Results (from the past 24 hour(s))   INFLUENZA A & B AG (RAPID TEST)    Collection Time: 01/08/17  9:58 PM   Result Value Ref Range    Influenza A Ag POSITIVE (A) NEG      Influenza B Ag NEGATIVE  NEG     URINE MICROSCOPIC    Collection Time: 01/08/17 10:08 PM   Result Value Ref Range    WBC 3-5 0 /hpf    RBC 5-10 0 /hpf    Epithelial cells 0-3 0 /hpf    Bacteria TRACE 0 /hpf    Casts GRANULAR 0 /lpf    Crystals 0 0 /LPF    Amorphous Crystals TRACE 0      Mucus 0 0 /lpf   CBC WITH AUTOMATED DIFF    Collection Time: 01/08/17 10:50 PM   Result Value Ref Range    WBC 10.9 4.3 - 11.1 K/uL    RBC 4.37 4.23 - 5.67 M/uL    HGB 12.8 (L) 13.6 - 17.2 g/dL    HCT 38.6 (L) 41.1 - 50.3 %    MCV 88.3 79.6 - 97.8 FL    MCH 29.3 26.1 - 32.9 PG    MCHC 33.2 31.4 - 35.0 g/dL    RDW 13.6 11.9 - 14.6 %    PLATELET 677 564 - 145 K/uL    MPV 8.8 (L) 10.8 - 14.1 FL    DF AUTOMATED      NEUTROPHILS 83 (H) 43 - 78 %    LYMPHOCYTES 7 (L) 13 - 44 %    MONOCYTES 10 4.0 - 12.0 % EOSINOPHILS 0 (L) 0.5 - 7.8 %    BASOPHILS 0 0.0 - 2.0 %    IMMATURE GRANULOCYTES 0.4 0.0 - 5.0 %    ABS. NEUTROPHILS 9.1 (H) 1.7 - 8.2 K/UL    ABS. LYMPHOCYTES 0.8 0.5 - 4.6 K/UL    ABS. MONOCYTES 1.0 0.1 - 1.3 K/UL    ABS. EOSINOPHILS 0.0 0.0 - 0.8 K/UL    ABS. BASOPHILS 0.0 0.0 - 0.2 K/UL    ABS. IMM. GRANS. 0.0 0.0 - 0.5 K/UL   METABOLIC PANEL, COMPREHENSIVE    Collection Time: 01/08/17 10:50 PM   Result Value Ref Range    Sodium 136 136 - 145 mmol/L    Potassium 3.9 3.5 - 5.1 mmol/L    Chloride 102 98 - 107 mmol/L    CO2 25 21 - 32 mmol/L    Anion gap 9 7 - 16 mmol/L    Glucose 132 (H) 65 - 100 mg/dL    BUN 18 8 - 23 MG/DL    Creatinine 1.47 0.8 - 1.5 MG/DL    GFR est AA 59 (L) >60 ml/min/1.73m2    GFR est non-AA 49 (L) >60 ml/min/1.73m2    Calcium 8.5 8.3 - 10.4 MG/DL    Bilirubin, total 0.4 0.2 - 1.1 MG/DL    ALT 34 12 - 65 U/L    AST 31 15 - 37 U/L    Alk.  phosphatase 68 50 - 136 U/L    Protein, total 6.3 6.3 - 8.2 g/dL    Albumin 3.0 (L) 3.2 - 4.6 g/dL    Globulin 3.3 2.3 - 3.5 g/dL    A-G Ratio 0.9 (L) 1.2 - 3.5     PROCALCITONIN    Collection Time: 01/08/17 10:50 PM   Result Value Ref Range    Procalcitonin 0.3 ng/mL   PROTHROMBIN TIME + INR    Collection Time: 01/08/17 10:50 PM   Result Value Ref Range    Prothrombin time 12.5 (H) 9.6 - 12.0 sec    INR 1.1 0.9 - 1.2

## 2017-01-09 NOTE — CONSULTS
[facility] Cardiology Consult                Date of  Admission: 1/8/2017  9:54 PM     Primary Care Physician: Dr Lee Lafleur  Primary Cardiologist: None  Referring Physician: Dr Kim [place] Physician: Dr Amaury Rushing     CC/Reason for consult: noel Hassan is a [de-identified] y.o. male admitted for Influenza A. He has a h/o DM, hip fracture, frequent falls and htn- currently DNR. He has no h/o cardiac disease. He has been debilitated since hip fracture with poor po intake and frequent falls in a nursing home. He had been in his usual state of health until Saturday night when he became poorly responsive. Son states that his vital signs were stable but he continued in this state until Sunday night when the nursing home felt he might be septic and transported him to [address] [place] [address] where he tested + for the flu. He was admitted to CCU, started on tamifly and solu medrol and given fluid bolus. CT head showed no acute process, CXR showed chronic changes, WBC 10.6, hgb 12.8, , K 4.1, BUN 16, cr 1.33. EKG showed NSR w rate 100 without ST/T wave changes. He was admitted to the CCU and at 1 PM went into a fib w RVR. Repeat EKG shows fib w rate 143. Patient didn't have any CP, dizziness, palpitations, syncope or note any irregular heart rate with tachycardia. BP was 139/74, he was given 10 mg IV cardizem and converted to NSR w /56. O2 sat 93% on high flow nasal cannula at 40 L/min.       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    Diabetes mellitus type 2, insulin dependent ([address] E11.9, Z79.4    ED (erectile dysfunction) N52.9    Acute respiratory failure with hypoxemia ([facility] J96.01    Dyslipidemia E78.5    Influenza A J10.1    Severe sepsis (HCC) A41.9, R65.20    Atrial fibrillation with RVR (HCC) I48.91       Past Medical History   Diagnosis Date    Femoral neck fracture ([address] 08/2015     Right hip fracture ORIF      Past Surgical History   Procedure Laterality Date    Hx appendectomy      Hx hemorrhoidectomy      Hx cataract removal Bilateral      bilateral with lens implants    Hx hip fracture tx Right 2015    Hx rotator cuff repair Right 2012     Allergies   Allergen Reactions    Pcn [Penicillins] Anaphylaxis     Throat swelled after taking.        Family History   Problem Relation Age of Onset    Heart Disease Mother       Social History   Substance Use Topics    Smoking status: Former Smoker     Packs/day: 0.50     Years: 6.00    Smokeless tobacco: Current User      Comment: stopped smoking in 1953/ chewed tobacco since 1953    Alcohol use No        Current Facility-Administered Medications   Medication Dose Route Frequency    acetaminophen (TYLENOL) tablet 500 mg  500 mg Oral Q4H PRN    aspirin (ASPIRIN) tablet 325 mg  325 mg Oral 7am    citalopram (CELEXA) tablet 10 mg  10 mg Oral DAILY    magnesium oxide (MAG-OX) tablet 200 mg  200 mg Oral DAILY    pantoprazole (PROTONIX) tablet 40 mg  40 mg Oral ACB    nateglinide (STARLIX) tablet 60 mg  60 mg Oral BID    tamsulosin (FLOMAX) capsule 0.4 mg  0.4 mg Oral DAILY    traMADol (ULTRAM) tablet 50 mg  50 mg Oral Q6H PRN    insulin NPH (NOVOLIN N, HUMULIN N) injection 40 Units  40 Units SubCUTAneous Q12H    insulin regular (NOVOLIN R, HUMULIN R) injection   SubCUTAneous Q6H    oseltamivir (TAMIFLU) capsule 75 mg  75 mg Oral BID    sodium chloride (NS) flush 5-10 mL  5-10 mL IntraVENous Q8H    sodium chloride (NS) flush 5-10 mL  5-10 mL IntraVENous PRN    albuterol (PROVENTIL VENTOLIN) nebulizer solution 2.5 mg  2.5 mg Nebulization Q4H RT    heparin (porcine) injection 5,000 Units  5,000 Units SubCUTAneous Q8H    guaiFENesin SR (MUCINEX) tablet 1,200 mg  1,200 mg Oral BID    dilTIAZem (CARDIZEM) 100 mg in 0.9% sodium chloride (MBP/ADV) 100 mL infusion  0-15 mg/hr IntraVENous TITRATE       Review of Symptoms:  General: + weight loss, + weakness, no fever or chills  Skin: no rashes, lumps, or other skin changes  HEENT: no headache, dizziness, lightheadedness, vision changes, + decreased hearing   Neck: no swollen glands, goiter, pain or stiffness  Respiratory: no cough, sputum, hemoptysis, + dyspnea, no wheezing  Cardiovascular: + as per HPI  Gastrointestinal: + GERD, no constipation, diarrhea, liver problems, or h/o GI bleed  Urinary: no frequency, urgency , hematuria, burning/pain with urination, recent flank pain, polyuria, nocturia, or difficulty urinating  Peripheral Vascular: no claudication, leg cramps, prior DVTs, swelling of calves, legs, or feet, color change, or swelling with redness or tenderness  Musculoskeletal: debility, has done poorly since hip fracture, frequent falls  Psychiatric: no depression or excessive stress  Neurological: no sensory or motor loss, seizures, syncope, tremors, numbness, no dementia  Hematologic: no anemia, easy bruising or bleeding  Endocrine: no thyroid problems, heat or cold intolerance, excessive sweating, polyuria, polydipsia, + diabetes.        Physical Exam  Vitals:    01/09/17 0931 01/09/17 1001 01/09/17 1002 01/09/17 1228   BP: 117/63 139/74     Pulse: 90  87    Resp:   19    Temp:       SpO2: 95%  93% 93%   Weight:       Height:           Physical Exam:  General: Frail but nourished male who appears his stated age,  high flow nasal cannula at 40 L/min  HEENT: pupils equal and round, no abnormalities noted  Neck: supple, no JVD, no carotid bruits  Heart: S1S2 with RRR   Lungs: B rhonchi   Abd: soft, nontender, nondistended, with good bowel sounds  Ext: warm, no edema  Skin: warm and dry  Psychiatric: Normal mood and affect  Neurologic: Alert and oriented X 3      Cardiographics    Telemetry: NSR w rate 93  ECG: NSR w rate 100 without ST/T wave changes      Labs:   Recent Labs      01/09/17   0320  01/08/17   2250   NA  136  136   K  4.1  3.9   BUN  16  18   CREA  1.35  1.47   GLU  201*  132*   WBC  10.6  10.9   HGB  12.8*  12.8*   HCT  38.9*  38.6*   PLT  166  160   INR   --   1.1        Assessment/Plan:     Assessment:   Influenza A- Tamiflu per pulmonary. Diabetes mellitus type 2, insulin dependent - Cont current meds. Acute respiratory failure with hypoxemia- 40 L/min high flow O2. Severe sepsis - Secondary to flu, cont tamiflu. Atrial fibrillation with RVR- New dx of a fib w rvr, now converted to NSR w one dose of IV cardizem. Not anticoagulation candidate due to frequent falls. Cont ASA, will start po cardizem, monitor rhythm, check echo, TSH, mag. Thank you very much for this referral. We appreciate the opportunity to participate in this patient's care. We will follow along with above stated plan.     Nohemi Elkins PA-C  Consulting MD: Vidhya Feliciano

## 2017-01-09 NOTE — PROGRESS NOTES
Family at bedside. Update given. Also notified of that starlix has been continued from home medications and it is to be patient supplied. Family has agreed to reach out to the skilled nursing facility and bring it tomorrow (Tuesday) morning.

## 2017-01-09 NOTE — PROGRESS NOTES
Pt presently without complaints--denying any CP, SOB, n/v. Respirations even, and non-labored. Slightly tachypneic. Pt becoming much more interactive with me when I enter the room. He is still confused, but communicates and converses well. Pt on clear liquid diet. Pt has no problems eating or swallowing at this time. Pt is presently in SR with HR of 80.

## 2017-01-09 NOTE — PROGRESS NOTES
Patient reluctant to speak, drowsy, stares without speaking when asked most questions, oriented to self, decreased command following. NSR on monitor, BP stable. O2 sat 94% on AirVO. Lung sounds very coarse/rhonci upper lobes. Nonproductive cough. Brief in place for incontinence. No drips. PIV started left forearm, blood sent to lab. Blood sugar 204, NPH insulin given per orders. Will continue to monitor.

## 2017-01-09 NOTE — PROGRESS NOTES
TRANSFER - IN REPORT:    Verbal report received from Forrest Morales, [facility] (name) on Shanice Waters  being received from ED (unit) for routine progression of care      Report consisted of patients Situation, Background, Assessment and   Recommendations(SBAR). Information from the following report(s) SBAR, Kardex, ED Summary, Intake/Output and MAR was reviewed with the receiving nurse. Opportunity for questions and clarification was provided. Assessment completed upon patients arrival to unit and care assumed. Dual skin assessment completed with Aldair Bower RN (name) findings were Patient with ecchymosis to BUE, moles. Contusion/scabbed area to back of head from fall PTA. Sacrum intact, allevyn placed.

## 2017-01-09 NOTE — INTERDISCIPLINARY ROUNDS
Interdisciplinary team rounds were held 1/9/2017 with the following team members:Nursing, Nurse Practitioner, Nutrition, Palliative Care, Pastoral Care, Pharmacy, Physician, Physician's Assistant and Respiratory Therapy and the patient. Plan of care discussed. See clinical pathway and/or care plan for interventions and desired outcomes.

## 2017-01-09 NOTE — PROGRESS NOTES
Patient making several attempts to get out of bed. Pt educated and reoriented. Call light in reach. Bed alarm on.

## 2017-01-10 ENCOUNTER — APPOINTMENT (OUTPATIENT)
Dept: GENERAL RADIOLOGY | Age: 82
DRG: 871 | End: 2017-01-10
Attending: INTERNAL MEDICINE
Payer: MEDICARE

## 2017-01-10 LAB
ANION GAP BLD CALC-SCNC: 10 MMOL/L (ref 7–16)
BASOPHILS # BLD AUTO: 0 K/UL (ref 0–0.2)
BASOPHILS # BLD: 0 % (ref 0–2)
BNP SERPL-MCNC: 85 PG/ML
BUN SERPL-MCNC: 21 MG/DL (ref 8–23)
CALCIUM SERPL-MCNC: 8.6 MG/DL (ref 8.3–10.4)
CHLORIDE SERPL-SCNC: 105 MMOL/L (ref 98–107)
CO2 SERPL-SCNC: 25 MMOL/L (ref 21–32)
CREAT SERPL-MCNC: 1.16 MG/DL (ref 0.8–1.5)
DIFFERENTIAL METHOD BLD: ABNORMAL
EOSINOPHIL # BLD: 0 K/UL (ref 0–0.8)
EOSINOPHIL NFR BLD: 0 % (ref 0.5–7.8)
ERYTHROCYTE [DISTWIDTH] IN BLOOD BY AUTOMATED COUNT: 13.7 % (ref 11.9–14.6)
GLUCOSE BLD STRIP.AUTO-MCNC: 101 MG/DL (ref 65–100)
GLUCOSE BLD STRIP.AUTO-MCNC: 124 MG/DL (ref 65–100)
GLUCOSE BLD STRIP.AUTO-MCNC: 75 MG/DL (ref 65–100)
GLUCOSE SERPL-MCNC: 62 MG/DL (ref 65–100)
HCT VFR BLD AUTO: 34.8 % (ref 41.1–50.3)
HGB BLD-MCNC: 11.8 G/DL (ref 13.6–17.2)
IMM GRANULOCYTES # BLD: 0 K/UL (ref 0–0.5)
IMM GRANULOCYTES NFR BLD AUTO: 0.3 % (ref 0–5)
LYMPHOCYTES # BLD AUTO: 9 % (ref 13–44)
LYMPHOCYTES # BLD: 1 K/UL (ref 0.5–4.6)
MAGNESIUM SERPL-MCNC: 2.3 MG/DL (ref 1.8–2.4)
MCH RBC QN AUTO: 29.6 PG (ref 26.1–32.9)
MCHC RBC AUTO-ENTMCNC: 33.9 G/DL (ref 31.4–35)
MCV RBC AUTO: 87.2 FL (ref 79.6–97.8)
MONOCYTES # BLD: 0.9 K/UL (ref 0.1–1.3)
MONOCYTES NFR BLD AUTO: 8 % (ref 4–12)
NEUTS SEG # BLD: 8.9 K/UL (ref 1.7–8.2)
NEUTS SEG NFR BLD AUTO: 83 % (ref 43–78)
PHOSPHATE SERPL-MCNC: 2.3 MG/DL (ref 2.3–3.7)
PLATELET # BLD AUTO: 157 K/UL (ref 150–450)
PMV BLD AUTO: 9.2 FL (ref 10.8–14.1)
POTASSIUM SERPL-SCNC: 4.1 MMOL/L (ref 3.5–5.1)
RBC # BLD AUTO: 3.99 M/UL (ref 4.23–5.67)
SODIUM SERPL-SCNC: 140 MMOL/L (ref 136–145)
WBC # BLD AUTO: 10.8 K/UL (ref 4.3–11.1)

## 2017-01-10 PROCEDURE — 99232 SBSQ HOSP IP/OBS MODERATE 35: CPT | Performed by: INTERNAL MEDICINE

## 2017-01-10 PROCEDURE — 84100 ASSAY OF PHOSPHORUS: CPT | Performed by: INTERNAL MEDICINE

## 2017-01-10 PROCEDURE — 71010 XR CHEST PORT: CPT

## 2017-01-10 PROCEDURE — 83880 ASSAY OF NATRIURETIC PEPTIDE: CPT | Performed by: INTERNAL MEDICINE

## 2017-01-10 PROCEDURE — 94640 AIRWAY INHALATION TREATMENT: CPT

## 2017-01-10 PROCEDURE — 83735 ASSAY OF MAGNESIUM: CPT | Performed by: INTERNAL MEDICINE

## 2017-01-10 PROCEDURE — 74011250637 HC RX REV CODE- 250/637: Performed by: INTERNAL MEDICINE

## 2017-01-10 PROCEDURE — 82962 GLUCOSE BLOOD TEST: CPT

## 2017-01-10 PROCEDURE — 80048 BASIC METABOLIC PNL TOTAL CA: CPT | Performed by: INTERNAL MEDICINE

## 2017-01-10 PROCEDURE — 74011250637 HC RX REV CODE- 250/637: Performed by: PHYSICIAN ASSISTANT

## 2017-01-10 PROCEDURE — 65270000029 HC RM PRIVATE

## 2017-01-10 PROCEDURE — 74011636637 HC RX REV CODE- 636/637: Performed by: INTERNAL MEDICINE

## 2017-01-10 PROCEDURE — 85025 COMPLETE CBC W/AUTO DIFF WBC: CPT | Performed by: INTERNAL MEDICINE

## 2017-01-10 PROCEDURE — 94760 N-INVAS EAR/PLS OXIMETRY 1: CPT

## 2017-01-10 PROCEDURE — 74011250636 HC RX REV CODE- 250/636: Performed by: INTERNAL MEDICINE

## 2017-01-10 PROCEDURE — 74011000250 HC RX REV CODE- 250: Performed by: INTERNAL MEDICINE

## 2017-01-10 PROCEDURE — 36415 COLL VENOUS BLD VENIPUNCTURE: CPT | Performed by: INTERNAL MEDICINE

## 2017-01-10 PROCEDURE — 77010033678 HC OXYGEN DAILY

## 2017-01-10 RX ORDER — DILTIAZEM HYDROCHLORIDE 120 MG/1
240 CAPSULE, COATED, EXTENDED RELEASE ORAL DAILY
Status: DISCONTINUED | OUTPATIENT
Start: 2017-01-11 | End: 2017-01-11 | Stop reason: HOSPADM

## 2017-01-10 RX ORDER — RISPERIDONE 0.5 MG/1
0.5 TABLET, FILM COATED ORAL 2 TIMES DAILY
Status: DISCONTINUED | OUTPATIENT
Start: 2017-01-10 | End: 2017-01-11 | Stop reason: HOSPADM

## 2017-01-10 RX ORDER — OSELTAMIVIR PHOSPHATE 30 MG/1
30 CAPSULE ORAL 2 TIMES DAILY
Status: DISCONTINUED | OUTPATIENT
Start: 2017-01-10 | End: 2017-01-11 | Stop reason: HOSPADM

## 2017-01-10 RX ADMIN — ALBUTEROL SULFATE 2.5 MG: 2.5 SOLUTION RESPIRATORY (INHALATION) at 16:16

## 2017-01-10 RX ADMIN — HEPARIN SODIUM 5000 UNITS: 5000 INJECTION, SOLUTION INTRAVENOUS; SUBCUTANEOUS at 21:39

## 2017-01-10 RX ADMIN — OSELTAMIVIR PHOSPHATE 75 MG: 75 CAPSULE ORAL at 09:15

## 2017-01-10 RX ADMIN — INSULIN HUMAN 40 UNITS: 100 INJECTION, SUSPENSION SUBCUTANEOUS at 15:23

## 2017-01-10 RX ADMIN — DILTIAZEM HYDROCHLORIDE 30 MG: 30 TABLET, FILM COATED ORAL at 12:00

## 2017-01-10 RX ADMIN — OSELTAMIVIR PHOSPHATE 30 MG: 30 CAPSULE ORAL at 16:12

## 2017-01-10 RX ADMIN — Medication 10 ML: at 06:01

## 2017-01-10 RX ADMIN — ALBUTEROL SULFATE 2.5 MG: 2.5 SOLUTION RESPIRATORY (INHALATION) at 11:19

## 2017-01-10 RX ADMIN — TAMSULOSIN HYDROCHLORIDE 0.4 MG: 0.4 CAPSULE ORAL at 09:15

## 2017-01-10 RX ADMIN — HEPARIN SODIUM 5000 UNITS: 5000 INJECTION, SOLUTION INTRAVENOUS; SUBCUTANEOUS at 05:58

## 2017-01-10 RX ADMIN — RISPERIDONE 0.5 MG: 0.5 TABLET ORAL at 21:58

## 2017-01-10 RX ADMIN — ALBUTEROL SULFATE 2.5 MG: 2.5 SOLUTION RESPIRATORY (INHALATION) at 20:24

## 2017-01-10 RX ADMIN — Medication 200 MG: at 09:15

## 2017-01-10 RX ADMIN — Medication 5 ML: at 14:00

## 2017-01-10 RX ADMIN — HEPARIN SODIUM 5000 UNITS: 5000 INJECTION, SOLUTION INTRAVENOUS; SUBCUTANEOUS at 15:24

## 2017-01-10 RX ADMIN — NATEGLINIDE 60 MG: 60 TABLET ORAL at 16:13

## 2017-01-10 RX ADMIN — DILTIAZEM HYDROCHLORIDE 30 MG: 30 TABLET, FILM COATED ORAL at 06:01

## 2017-01-10 RX ADMIN — ASPIRIN 325 MG ORAL TABLET 325 MG: 325 PILL ORAL at 09:15

## 2017-01-10 RX ADMIN — GUAIFENESIN 1200 MG: 600 TABLET, EXTENDED RELEASE ORAL at 16:13

## 2017-01-10 RX ADMIN — Medication 5 ML: at 21:40

## 2017-01-10 RX ADMIN — PANTOPRAZOLE SODIUM 40 MG: 40 TABLET, DELAYED RELEASE ORAL at 09:15

## 2017-01-10 RX ADMIN — GUAIFENESIN 1200 MG: 600 TABLET, EXTENDED RELEASE ORAL at 09:15

## 2017-01-10 RX ADMIN — CITALOPRAM HYDROBROMIDE 10 MG: 20 TABLET ORAL at 12:00

## 2017-01-10 NOTE — PROGRESS NOTES
1/10/2017 4:10 PM    Admit Date: 1/8/2017    Admit Diagnosis: Influenza A      Subjective:   No cp or sob      Objective:      Visit Vitals    /85 (BP 1 Location: Right arm, BP Patient Position: At rest)    Pulse 73    Temp 97.7 °F (36.5 °C)    Resp 20    Ht 5' 10\" (1.778 m)    Wt 72.2 kg (159 lb 2.8 oz)    SpO2 96%    BMI 22.84 kg/m2       Physical Exam:  [facility], Well Nourished, No Acute Distress, Alert & Oriented x 3, appropriate mood. Neck- supple, no JVD  CV- regular rate and rhythm no MRG  Lung- clear bilaterally  Abd- soft, nontender, nondistended  Ext- no edema bilaterally. Skin- warm and dry        Data Review:   Recent Labs      01/10/17   0410   01/08/17   2250   NA  140   < >  136   K  4.1   < >  3.9   BUN  21   < >  18   CREA  1.16   < >  1.47   WBC  10.8   < >  10.9   HGB  11.8*   < >  12.8*   HCT  34.8*   < >  38.6*   PLT  157   < >  160   INR   --    --   1.1    < > = values in this interval not displayed. Assessment/Plan:     Principal Problem:    Influenza A (1/9/2017)per primary    Active Problems:    Diabetes mellitus type 2, insulin dependent ([facility] (1/9/2017)      Acute respiratory failure with hypoxemia ([facility] (1/9/2017)      Severe sepsis ([facility] [facility] (1/9/2017)      Atrial fibrillation with RVR ([facility] (1/9/2017)Stable. Continue current medical therapy.   Cont po cardizem- will change to cardizem cd  240mg qd

## 2017-01-10 NOTE — PROGRESS NOTES
Patient arrived to room 830 from CCU. Patient is alert with confusion at all times. Respirations even and unlabored with heart rate regular. Patient unable to ambulate independently; needs x1 assist.  Posey in place and alarmed r/t confusion. Duel skin assessment completed with [facility], RN. No skin issues noted but patient has bilateral discoloration. IV intact and patent with no s/s of infection noted. Bed in low locked position with call light within reach. Patient instructed to call if assistance is needed. Door open for visualization r/t AMS.  on door r/t Flu+.

## 2017-01-10 NOTE — PROGRESS NOTES
Patient more anxious but not able to verbalize. Risk for falls. Will try Risperdal to see if keeps him calm. Since haldol is not effective.   Hue Cooper MD

## 2017-01-10 NOTE — PROGRESS NOTES
Patient stable with no complaints at this time. Call light within reach.   Report to be given to oncoming RN 7p-7a

## 2017-01-10 NOTE — PROGRESS NOTES
Spoke with pt's daughter, Jamaal Santamaria. Pt has a long term bed at [facility]. Pt can return when medically stable. SW following.

## 2017-01-10 NOTE — PROGRESS NOTES
Haldol given per order 2ndary to consistent confusion and multiple attempts to leave bed. Bed alarm on, patient within view of this RN work station. Other RNs on floor advised of patient's frequent attempts to leave bed.

## 2017-01-10 NOTE — PROGRESS NOTES
In for re-eval. Patient asleep, awoken with verbal stimuli. Patient appears to be at baseline mentation. BGL check for insulin administration, BGL found to be 53mg/dl. Patient non-diaphoretic, no other s/s of hypoglycemia. Contacted MD Rose Mac via phone. Order for amp of D50 (or D10 equivalent) and to hold AM dose of NPH insulin. Will administer when cleared by pharmacy.

## 2017-01-10 NOTE — PROGRESS NOTES
Patient largely restful though with two episodes of pulling his EKG leads, oxymeter off. Attempted to re-orient, patient still confused.

## 2017-01-10 NOTE — PROGRESS NOTES
Full report from [facility]. Patient with increased confusion, multiple attempts to get out of bed. MD Hannah Beltran contacted with order for seroquel. Will administer. Bed alarms on.

## 2017-01-10 NOTE — PROGRESS NOTES
No major changes, patient with low grade fever but not requiring tylenol. Patient has been more somnolent, largely restful when not disturbed. No further episodes of pulling at equipment over past few hours. Patient easily awoken with verbal stimuli, back to restful. No further needs expressed or apparent at this time.

## 2017-01-10 NOTE — PROGRESS NOTES
TRANSFER - IN REPORT:    [facility]  being received from CCU for routine progression of care      Report consisted of patients Situation, Background, Assessment and   Recommendations(SBAR). Information from the following report(s) SBAR was reviewed with the receiving nurse. Opportunity for questions and clarification was provided. Assessment completed upon patients arrival to unit and care assumed.

## 2017-01-10 NOTE — PROGRESS NOTES
Critical Care Daily Progress Note: 1/10/2017    Gunjan Carpio   Admission Date: 1/8/2017         The patient's chart is reviewed and the patient is discussed with the staff. [de-identified] yo WM admitted with influenza and afib with RVR. Subjective:     Back in NSR. Essentially on RA at present.  Was on Airvo until this AM.     Current Facility-Administered Medications   Medication Dose Route Frequency    acetaminophen (TYLENOL) tablet 500 mg  500 mg Oral Q4H PRN    aspirin (ASPIRIN) tablet 325 mg  325 mg Oral 7am    citalopram (CELEXA) tablet 10 mg  10 mg Oral DAILY    magnesium oxide (MAG-OX) tablet 200 mg  200 mg Oral DAILY    pantoprazole (PROTONIX) tablet 40 mg  40 mg Oral ACB    nateglinide (STARLIX) tablet 60 mg  60 mg Oral BID    tamsulosin (FLOMAX) capsule 0.4 mg  0.4 mg Oral DAILY    traMADol (ULTRAM) tablet 50 mg  50 mg Oral Q6H PRN    insulin NPH (NOVOLIN N, HUMULIN N) injection 40 Units  40 Units SubCUTAneous Q12H    insulin regular (NOVOLIN R, HUMULIN R) injection   SubCUTAneous Q6H    oseltamivir (TAMIFLU) capsule 75 mg  75 mg Oral BID    sodium chloride (NS) flush 5-10 mL  5-10 mL IntraVENous Q8H    sodium chloride (NS) flush 5-10 mL  5-10 mL IntraVENous PRN    albuterol (PROVENTIL VENTOLIN) nebulizer solution 2.5 mg  2.5 mg Nebulization Q4H RT    heparin (porcine) injection 5,000 Units  5,000 Units SubCUTAneous Q8H    guaiFENesin SR (MUCINEX) tablet 1,200 mg  1,200 mg Oral BID    dilTIAZem (CARDIZEM) IR tablet 30 mg  30 mg Oral Q6H    haloperidol lactate (HALDOL) injection 5 mg  5 mg IntraVENous Q8H PRN       Review of Systems    Constitutional:  negative for fever, chills, sweats  Cardiovascular:  negative for chest pain, palpitations, syncope, edema  Gastrointestinal:  negative for dysphagia, reflux, vomiting, diarrhea, abdominal pain, or melena  Neurologic:  negative for focal weakness, numbness, headache      Objective:     Vitals:    01/10/17 4080 01/10/17 7941 01/10/17 0921 01/10/17 0951   BP: 115/69 121/62 109/62 100/67   Pulse: 63 61 82 67   Resp: 24 17 (!) 38 28   Temp:       SpO2: 96% 95% 95% 97%   Weight:       Height:           Intake and Output:   01/08 1901 - 01/10 0700  In: -   Out: 462 [Urine:462]  01/10 0701 - 01/10 1900  In: -   Out: 300 [Urine:300]    Physical Exam:          Constitutional:  the patient is well developed and in no acute distress  EENMT:  Sclera clear, pupils equal, oral mucosa moist  Respiratory: scattered rhonchi  Cardiovascular:  RRR without M,G,R  Gastrointestinal: soft and non-tender; with positive bowel sounds. Musculoskeletal: warm without cyanosis. There is no lower leg edema.   Skin:  no jaundice or rashes  Neurologic: no gross neuro deficits     Psychiatric:  alert and FC; [place]    LINES:  peripherals    DRIPS:   None    CHEST XRAY:           LAB  Recent Labs      01/10/17   0601  01/09/17   2314  01/09/17   2312  01/09/17   1829  01/09/17   1628   GLUCPOC  75  53*  52*  215*  153*      Recent Labs      01/10/17   0410  01/09/17   0320  01/08/17   2250   WBC  10.8  10.6  10.9   HGB  11.8*  12.8*  12.8*   HCT  34.8*  38.9*  38.6*   PLT  157  166  160   INR   --    --   1.1     Recent Labs      01/10/17   0410  01/09/17   1409  01/09/17   0320  01/08/17   2250   NA  140  137  136  136   K  4.1  3.9  4.1  3.9   CL  105  104  103  102   CO2  25  21  24  25   GLU  62*  197*  201*  132*   BUN  21  19  16  18   CREA  1.16  1.02  1.35  1.47   MG  2.3  2.2   --    --    PHOS  2.3   --    --    --    CA  8.6  8.7  8.6  8.5   TROIQ   --   0.04   --    --    ALB   --    --    --   3.0*   TBILI   --    --    --   0.4   ALT   --    --    --   34   SGOT   --    --    --   31     Recent Labs      01/09/17   0335  01/09/17   0104   PH  7.34*  7.32*   PCO2  41  41   PO2  64*  76   HCO3  22  21*     Recent Labs      01/09/17   0320   LAC  1.1       Assessment:  (Medical Decision Making)     Hospital Problems  Date Reviewed: 1/9/2017          Codes Class Noted POA    Diabetes mellitus type 2, insulin dependent ([facility] ICD-10-CM: E11.9, Z79.4  ICD-9-CM: 250.00, V58.67  1/9/2017 Yes    Chronic    Acute respiratory failure with hypoxemia [facility]) ICD-10-CM: J96.01  ICD-9-CM: 518.81  1/9/2017 Yes    Resolved    * (Principal)Influenza A ICD-10-CM: J10.1  ICD-9-CM: 487.1  1/9/2017 Yes    On Tamiflu    Severe sepsis ([facility] ICD-10-CM: A41.9, R65.20  ICD-9-CM: 038.9, 995.92  1/9/2017 Yes    Hemodynamics better    Atrial fibrillation with RVR ([facility]) ICD-10-CM: I48.91  ICD-9-CM: 427.31  1/9/2017 Unknown    Converted to SR          Plan:  (Medical Decision Making)     Continue tamiflu. Check BNP given interstitial prominence on CXR. Cardiac meds per cardiology. ? Need to continue cardizem. Add mucolytics. Shelley Sharmin for floor. Possibly back to NH tomorrow. --    More than 50% of the time documented was spent in face-to-face contact with the patient and in the care of the patient on the floor/unit where the patient is located.     Denita Vásquez MD

## 2017-01-10 NOTE — PROGRESS NOTES
Spiritual Care visit. Initial Visit.  conversed with patient at bedside. Listened as patient spoke of his life and keena. Stated that he has gone to the same [facility] \"just about  All of\" his life.  prayed with him for healing.     Visit by Dennise Martel M.Ed., Th.B. ,Staff

## 2017-01-10 NOTE — PROGRESS NOTES
Bedside and Verbal shift change report given to RN Tara Mills (oncoming nurse) by Sharron Perez (offgoing nurse). Report included the following information SBAR, Kardex, ED Summary, Intake/Output, MAR, Recent Results and Cardiac Rhythm Variable AFib (rate controlled) and NSR. We discussed in depth patient's need for sedation meds due to agitation overnight and patient's multiple attempts to leave bed. In addition, we discussed patient's variable EKG between Afib and NSR. Care assumed by oncoming RN at this time. Bed alarm remains on for patient safety.

## 2017-01-11 ENCOUNTER — APPOINTMENT (OUTPATIENT)
Dept: GENERAL RADIOLOGY | Age: 82
DRG: 871 | End: 2017-01-11
Attending: INTERNAL MEDICINE
Payer: MEDICARE

## 2017-01-11 VITALS
RESPIRATION RATE: 18 BRPM | HEART RATE: 89 BPM | BODY MASS INDEX: 22.79 KG/M2 | DIASTOLIC BLOOD PRESSURE: 88 MMHG | WEIGHT: 159.17 LBS | TEMPERATURE: 97.8 F | HEIGHT: 70 IN | OXYGEN SATURATION: 98 % | SYSTOLIC BLOOD PRESSURE: 144 MMHG

## 2017-01-11 LAB
ANION GAP BLD CALC-SCNC: 8 MMOL/L (ref 7–16)
BASOPHILS # BLD AUTO: 0 K/UL (ref 0–0.2)
BASOPHILS # BLD: 0 % (ref 0–2)
BUN SERPL-MCNC: 21 MG/DL (ref 8–23)
CALCIUM SERPL-MCNC: 9 MG/DL (ref 8.3–10.4)
CHLORIDE SERPL-SCNC: 105 MMOL/L (ref 98–107)
CO2 SERPL-SCNC: 27 MMOL/L (ref 21–32)
CREAT SERPL-MCNC: 1.18 MG/DL (ref 0.8–1.5)
DIFFERENTIAL METHOD BLD: ABNORMAL
EOSINOPHIL # BLD: 0 K/UL (ref 0–0.8)
EOSINOPHIL NFR BLD: 0 % (ref 0.5–7.8)
ERYTHROCYTE [DISTWIDTH] IN BLOOD BY AUTOMATED COUNT: 13.8 % (ref 11.9–14.6)
GLUCOSE BLD STRIP.AUTO-MCNC: 110 MG/DL (ref 65–100)
GLUCOSE BLD STRIP.AUTO-MCNC: 143 MG/DL (ref 65–100)
GLUCOSE BLD STRIP.AUTO-MCNC: 201 MG/DL (ref 65–100)
GLUCOSE BLD STRIP.AUTO-MCNC: 48 MG/DL (ref 65–100)
GLUCOSE BLD STRIP.AUTO-MCNC: 54 MG/DL (ref 65–100)
GLUCOSE BLD STRIP.AUTO-MCNC: 63 MG/DL (ref 65–100)
GLUCOSE SERPL-MCNC: 130 MG/DL (ref 65–100)
HCT VFR BLD AUTO: 33.9 % (ref 41.1–50.3)
HGB BLD-MCNC: 11.4 G/DL (ref 13.6–17.2)
IMM GRANULOCYTES # BLD: 0 K/UL (ref 0–0.5)
IMM GRANULOCYTES NFR BLD AUTO: 0.2 % (ref 0–5)
LYMPHOCYTES # BLD AUTO: 12 % (ref 13–44)
LYMPHOCYTES # BLD: 0.8 K/UL (ref 0.5–4.6)
MAGNESIUM SERPL-MCNC: 2.2 MG/DL (ref 1.8–2.4)
MCH RBC QN AUTO: 29 PG (ref 26.1–32.9)
MCHC RBC AUTO-ENTMCNC: 33.6 G/DL (ref 31.4–35)
MCV RBC AUTO: 86.3 FL (ref 79.6–97.8)
MONOCYTES # BLD: 0.4 K/UL (ref 0.1–1.3)
MONOCYTES NFR BLD AUTO: 7 % (ref 4–12)
NEUTS SEG # BLD: 5.2 K/UL (ref 1.7–8.2)
NEUTS SEG NFR BLD AUTO: 81 % (ref 43–78)
PHOSPHATE SERPL-MCNC: 1.9 MG/DL (ref 2.3–3.7)
PLATELET # BLD AUTO: 183 K/UL (ref 150–450)
PMV BLD AUTO: 9 FL (ref 10.8–14.1)
POTASSIUM SERPL-SCNC: 3.7 MMOL/L (ref 3.5–5.1)
RBC # BLD AUTO: 3.93 M/UL (ref 4.23–5.67)
SODIUM SERPL-SCNC: 140 MMOL/L (ref 136–145)
WBC # BLD AUTO: 6.5 K/UL (ref 4.3–11.1)

## 2017-01-11 PROCEDURE — 74011250637 HC RX REV CODE- 250/637: Performed by: INTERNAL MEDICINE

## 2017-01-11 PROCEDURE — 84100 ASSAY OF PHOSPHORUS: CPT | Performed by: INTERNAL MEDICINE

## 2017-01-11 PROCEDURE — 80048 BASIC METABOLIC PNL TOTAL CA: CPT | Performed by: INTERNAL MEDICINE

## 2017-01-11 PROCEDURE — 85025 COMPLETE CBC W/AUTO DIFF WBC: CPT | Performed by: INTERNAL MEDICINE

## 2017-01-11 PROCEDURE — 71010 XR CHEST PORT: CPT

## 2017-01-11 PROCEDURE — 94760 N-INVAS EAR/PLS OXIMETRY 1: CPT

## 2017-01-11 PROCEDURE — 36415 COLL VENOUS BLD VENIPUNCTURE: CPT | Performed by: INTERNAL MEDICINE

## 2017-01-11 PROCEDURE — 94640 AIRWAY INHALATION TREATMENT: CPT

## 2017-01-11 PROCEDURE — 74011000250 HC RX REV CODE- 250: Performed by: INTERNAL MEDICINE

## 2017-01-11 PROCEDURE — 99239 HOSP IP/OBS DSCHRG MGMT >30: CPT | Performed by: INTERNAL MEDICINE

## 2017-01-11 PROCEDURE — 97161 PT EVAL LOW COMPLEX 20 MIN: CPT

## 2017-01-11 PROCEDURE — 74011250636 HC RX REV CODE- 250/636: Performed by: INTERNAL MEDICINE

## 2017-01-11 PROCEDURE — 83735 ASSAY OF MAGNESIUM: CPT | Performed by: INTERNAL MEDICINE

## 2017-01-11 RX ORDER — DILTIAZEM HYDROCHLORIDE 240 MG/1
240 CAPSULE, COATED, EXTENDED RELEASE ORAL DAILY
Qty: 30 CAP | Refills: 1 | Status: SHIPPED | OUTPATIENT
Start: 2017-01-11

## 2017-01-11 RX ORDER — OSELTAMIVIR PHOSPHATE 30 MG/1
30 CAPSULE ORAL 2 TIMES DAILY
Qty: 7 CAP | Refills: 0 | Status: SHIPPED | OUTPATIENT
Start: 2017-01-09 | End: 2017-01-14

## 2017-01-11 RX ORDER — DEXTROSE 50 % IN WATER (D50W) INTRAVENOUS SYRINGE
25 AS NEEDED
Status: DISCONTINUED | OUTPATIENT
Start: 2017-01-11 | End: 2017-01-11 | Stop reason: HOSPADM

## 2017-01-11 RX ORDER — INSULIN LISPRO 100 [IU]/ML
5 INJECTION, SOLUTION INTRAVENOUS; SUBCUTANEOUS
Status: DISCONTINUED | OUTPATIENT
Start: 2017-01-11 | End: 2017-01-11 | Stop reason: HOSPADM

## 2017-01-11 RX ADMIN — DEXTROSE MONOHYDRATE 25 G: 25 INJECTION, SOLUTION INTRAVENOUS at 00:34

## 2017-01-11 RX ADMIN — ASPIRIN 325 MG ORAL TABLET 325 MG: 325 PILL ORAL at 05:33

## 2017-01-11 RX ADMIN — ALBUTEROL SULFATE 2.5 MG: 2.5 SOLUTION RESPIRATORY (INHALATION) at 11:21

## 2017-01-11 RX ADMIN — HEPARIN SODIUM 5000 UNITS: 5000 INJECTION, SOLUTION INTRAVENOUS; SUBCUTANEOUS at 05:34

## 2017-01-11 RX ADMIN — NATEGLINIDE 60 MG: 60 TABLET ORAL at 08:21

## 2017-01-11 RX ADMIN — Medication 200 MG: at 08:16

## 2017-01-11 RX ADMIN — Medication 5 ML: at 05:34

## 2017-01-11 RX ADMIN — ALBUTEROL SULFATE 2.5 MG: 2.5 SOLUTION RESPIRATORY (INHALATION) at 03:49

## 2017-01-11 RX ADMIN — PANTOPRAZOLE SODIUM 40 MG: 40 TABLET, DELAYED RELEASE ORAL at 05:35

## 2017-01-11 RX ADMIN — ALBUTEROL SULFATE 2.5 MG: 2.5 SOLUTION RESPIRATORY (INHALATION) at 08:37

## 2017-01-11 RX ADMIN — OSELTAMIVIR PHOSPHATE 30 MG: 30 CAPSULE ORAL at 08:15

## 2017-01-11 RX ADMIN — TAMSULOSIN HYDROCHLORIDE 0.4 MG: 0.4 CAPSULE ORAL at 08:15

## 2017-01-11 RX ADMIN — GUAIFENESIN 1200 MG: 600 TABLET, EXTENDED RELEASE ORAL at 08:15

## 2017-01-11 RX ADMIN — RISPERIDONE 0.5 MG: 0.5 TABLET ORAL at 08:15

## 2017-01-11 RX ADMIN — CITALOPRAM HYDROBROMIDE 10 MG: 20 TABLET ORAL at 08:15

## 2017-01-11 RX ADMIN — DILTIAZEM HYDROCHLORIDE 240 MG: 120 CAPSULE, COATED, EXTENDED RELEASE ORAL at 08:15

## 2017-01-11 RX ADMIN — ALBUTEROL SULFATE 2.5 MG: 2.5 SOLUTION RESPIRATORY (INHALATION) at 00:06

## 2017-01-11 NOTE — DISCHARGE SUMMARY
DISCHARGE NOTE    Patricia Voss  Admission date:  1/8/2017  Discharge date:  1/11/2017    Admitting Diagnosis:  Influenza A    Discharge Diagnoses:    Hospital Problems  Date Reviewed: 1/10/2017          Codes Class Noted POA    Diabetes mellitus type 2, insulin dependent ([facility] ICD-10-CM: E11.9, Z79.4  ICD-9-CM: 250.00, V58.67  1/9/2017 Yes        Acute respiratory failure with hypoxemia [facility]) ICD-10-CM: J96.01  ICD-9-CM: 518.81  1/9/2017 Yes        * (Principal)Influenza A ICD-10-CM: J10.1  ICD-9-CM: 487.1  1/9/2017 Yes        Severe sepsis ([facility] ICD-10-CM: A41.9, R65.20  ICD-9-CM: 038.9, 995.92  1/9/2017 Yes        Atrial fibrillation with RVR ([facility]) ICD-10-CM: I48.91  ICD-9-CM: 427.31  1/9/2017 Unknown              Consultants:  [facility]    Studies/Procedures:    Influenza A positive 1/8    CXR 1/11/17: The cardiac silhouette is stable. Bilateral interstitial coarsening is similar  to prior exam. Linear opacity in the lateral right lung base likely represents a  skinfold. No large pleural effusion or evidence of pneumothorax. IMPRESSION:  No significant interval change. TTE 1/9/17:  -  Left ventricle: Systolic function was normal. Ejection fraction was  estimated in the range of 60 % to 65 %. There were no regional wall motion  abnormalities. Wall thickness was mildly to moderately increased. Doppler  parameters were consistent with mild diastolic dysfunction (grade 1). -  Right ventricle: There was no pulmonary artery hypertension.  -  Aortic valve: The valve was probably trileaflet. Leaflets exhibited mild  sclerosis. There was trivial regurgitation. -  Mitral valve: There was mild annular calcification. There was trivial  regurgitation. -  Tricuspid valve: There was mild regurgitation.     Condition on Discharge: stable on RA    Disposition:  Return to residential facility      Presenting Illness: Patricia Voss is an [de-identified] yoM who is a NH resident with PMH of DM, hp fracture, aflls, htn who was admitted on 1/8/17 with influenza A. He was confused in the nursing home and was initially minimally responsive in the emergency room. He was admitted and placed on Tamiflu for 5 day course. He also was found to have atrial fibrillation with HR of 143. He converted to sinus after 10mg IV cardizem. He was not considered an anticoagulation candidate due to falls. He was continued on po cardizem and f/u with cardiology will be arranged. After fluid resuscitiation his mental status improved. He initially required high flow nasal cannula oxygen but has been weaned to room air. Physical Exam:   Constitution:  the patient is well developed and in no acute distress  EENMT:  Sclera clear, pupils equal, oral mucosa moist.  Very [place]  Respiratory: few crackles in R base  Cardiovascular:  RRR without M,G,R  Gastrointestinal: soft and non-tender; with positive bowel sounds. Musculoskeletal: warm without cyanosis. There is nolower leg edema.   Skin:  no jaundice or rashess   Neurologic: no gross neuro deficits     Psychiatric:  alert and oriented x 3      LAB  Recent Labs      01/11/17   0722  01/10/17   0410  01/09/17   0320  01/08/17   2250   WBC  6.5  10.8  10.6  10.9   HGB  11.4*  11.8*  12.8*  12.8*   HCT  33.9*  34.8*  38.9*  38.6*   PLT  183  157  166  160   INR   --    --    --   1.1     Recent Labs      01/11/17   0722  01/10/17   0410  01/09/17   1409   01/08/17   2250   NA  140  140  137   < >  136   K  3.7  4.1  3.9   < >  3.9   CL  105  105  104   < >  102   CO2  27  25  21   < >  25   BUN  21  21  19   < >  18   CREA  1.18  1.16  1.02   < >  1.47   MG  2.2  2.3  2.2   --    --    PHOS  1.9*  2.3   --    --    --    CA  9.0  8.6  8.7   < >  8.5   TROIQ   --    --   0.04   --    --    ALB   --    --    --    --   3.0*   TBILI   --    --    --    --   0.4   ALT   --    --    --    --   34   SGOT   --    --    --    --   31   BNPP   --   85   --    --    --     < > = values in this interval not displayed. Recent Labs      01/09/17   0335  01/09/17   0104   PH  7.34*  7.32*   PCO2  41  41   PO2  64*  76   HCO3  22  21*       Discharge Medications:   Current Discharge Medication List      START taking these medications    Details   dilTIAZem CD (CARDIZEM CD) 240 mg ER capsule Take 1 Cap by mouth daily. Qty: 30 Cap, Refills: 1      oseltamivir (TAMIFLU) 30 mg capsule Take 1 Cap by mouth two (2) times a day for 5 days. Qty: 7 Cap, Refills: 0         CONTINUE these medications which have NOT CHANGED    Details   !! Cholecalciferol, Vitamin D3, 50,000 unit cap Take 50,000 Units by mouth every seven (7) days. lisinopril (PRINIVIL, ZESTRIL) 30 mg tablet Take 30 mg by mouth daily. Take 1/2 tab once a day      sodium chloride 1 gram tablet Take 1 g by mouth daily. Take one daily      calcium-cholecalciferol, d3, (CALCIUM 600 + D) 600-125 mg-unit tab Take 600 mg by mouth. insulin lispro (HUMALOG) 100 unit/mL injection 5 Units by SubCUTAneous route. insulin glargine (LANTUS) 100 unit/mL injection 24 Units by SubCUTAneous route nightly.      magnesium oxide 500 mg tab Take 250 mg by mouth.      multivitamin,tx-iron-ca-min (THERA-M) 27-0.4 mg tab Take 1 Tab by mouth daily. acetaminophen (TYLENOL) 325 mg tablet Take 500 mg by mouth every four (4) hours as needed for Pain.      traMADol (ULTRAM) 50 mg tablet Take 1 Tab by mouth every six (6) hours as needed. Max Daily Amount: 200 mg. Qty: 10 Tab, Refills: 0      omeprazole (PRILOSEC) 20 mg capsule Take 20 mg by mouth daily. citalopram (CELEXA) 20 mg tablet Take 10 mg by mouth daily. tamsulosin (FLOMAX) 0.4 mg capsule take 1 capsule by mouth once daily  Qty: 30 capsule, Refills: 11      aspirin (ASPIRIN) 325 mg tablet Take 325 mg by mouth every morning. Stopping beginning 12/15/12.       nateglinide (STARLIX) 60 mg tablet Take 60 mg by mouth two (2) times a day.  Take 1 three times a day      !! cholecalciferol (VITAMIN D3) 1,000 unit cap Take  by mouth every seven (7) days. !! - Potential duplicate medications found. Please discuss with provider. Condition on Discharge:  Stable on room air      Followup/Outpt Studies:  --Follow up appointment with [facility] in 2-3 weeks for atrial fibrillation. --F/u with PCP within one month  --Total discharge greater than 30 minutes in duration. More than 50% of the time documented was spent in face-to-face contact with the patient and in the care of the patient on the floor/unit where the patient is located.     Hillary Martel MD

## 2017-01-11 NOTE — PROGRESS NOTES
Shift assessment complete. Pt resting comfortably in bed. No signs of acute distress noted. Alert and oriented to person and place. Pt is confused. [place] pad on and in place for pt safety. Call light within reach. Pt instructed to call for assistance.

## 2017-01-11 NOTE — PROGRESS NOTES
Pt resting comfortably in bed. Alert and oriented to person and place. Periodic confusion. [place] pad on and in place. No signs of acute distress. Call light within reach. Pt instructed to call for assistance.

## 2017-01-11 NOTE — PROGRESS NOTES
[facility] CARDIOLOGY PROGRESS NOTE           1/11/2017 9:57 AM    Admit Date: 1/8/2017      Subjective:   Resting in bed. Denies any chest pain, SOB or heart palpitations. Echo with preserved LV function. ROS:  Cardiovascular:  As noted above    Objective:      Vitals:    01/11/17 0342 01/11/17 0350 01/11/17 0731 01/11/17 0838   BP: 143/75  144/83    Pulse: 73  69    Resp: 18  18    Temp: 97.9 °F (36.6 °C)  98 °F (36.7 °C)    SpO2: 95% 96% 96% 96%   Weight:       Height:           Physical Exam:  General-No Acute Distress  Neck- supple, no JVD  CV- regular rate and rhythm no MRG  Lung- clear bilaterally  Abd- soft, nontender, nondistended  Ext- no edema bilaterally. Skin- warm and dry    Data Review:   Recent Labs      01/11/17   0722  01/10/17   0410  01/09/17   1409   01/08/17   2250   NA  140  140  137   < >  136   K  3.7  4.1  3.9   < >  3.9   MG  2.2  2.3  2.2   < >   --    BUN  21  21  19   < >  18   CREA  1.18  1.16  1.02   < >  1.47   GLU  130*  62*  197*   < >  132*   WBC  6.5  10.8   --    < >  10.9   HGB  11.4*  11.8*   --    < >  12.8*   HCT  33.9*  34.8*   --    < >  38.6*   PLT  183  157   --    < >  160   INR   --    --    --    --   1.1   TROIQ   --    --   0.04   --    --     < > = values in this interval not displayed. Assessment/Plan:     Principal Problem:    Influenza A (1/9/2017)- per primary    Active Problems:    Diabetes mellitus type 2, insulin dependent ([facility] (1/9/2017)      Acute respiratory failure with hypoxemia ([facility] (1/9/2017)      Severe sepsis ([facility] (1/9/2017)      Atrial fibrillation with RVR ([facility] (1/9/2017)- controlled; SR by exam. Continue cardizem po. No AC d/t frequent falls.        Refugio Cruz NP  1/11/2017 9:57 AM

## 2017-01-11 NOTE — PROGRESS NOTES
Patient in bed resting with no complaints at this time. Patient is alert with confusion but no distress noted. IV intact and patent with no s/s of infection noted. Respirations even and unlabored with heart rate regular. Patient unable to ambulate independently without assistance; needs x1. Posey in place and alarmed. Patient very impulsive and confused. Bed in low locked position with call light within reach. Patient instructed to call if assistance is needed. Will continue to monitor.

## 2017-01-11 NOTE — PROGRESS NOTES
Problem: Mobility Impaired (Adult and Pediatric)  Goal: *Acute Goals and Plan of Care (Insert Text)  No goals Mr. Sahil Erazo is at baseline function. Felicitas Bloch Culumovic, PT      PHYSICAL THERAPY: INITIAL ASSESSMENT, DISCHARGE 1/11/2017  INPATIENT: Hospital Day: 4  [place] / [place] / Product Type: Medicare /      NAME/AGE/GENDER: Jones Billy is a [de-identified] y.o. male        PRIMARY DIAGNOSIS: Influenza A Influenza A Influenza A        ICD-10: Treatment Diagnosis: Generalized Muscle Weakness (M62.81)  Difficulty in walking, Not elsewhere classified (R26.2)  Precautions/Allergies:   Pcn [penicillins]       ASSESSMENT:      Mr. Sahil Erazo presents at baseline function. He lives at a [facility] in long term care. He shares a room with his wife. After evaluation and talking with daughter it is clear that he is ok to return to the room with his wife. Recommend maybe additional supervision by staff for the transition but feel it is the best plan. He currently is supervision for bed mobility and transfers. His daughter states he is in a w/c all day. Transfers to and from the bathroom on his own. Discussed findings with social work and MD.      This section established at most recent assessment   PROBLEM LIST (Impairments causing functional limitations): 1. none    INTERVENTIONS PLANNED: (Benefits and precautions of physical therapy have been discussed with the patient.)  1. none      TREATMENT PLAN: Frequency/Duration: None  Rehabilitation Potential For Stated Goals: NA      RECOMMENDED REHABILITATION/EQUIPMENT: (at time of discharge pending progress): None. HISTORY:   History of Present Injury/Illness (Reason for Referral):  Patient is a [de-identified] y.o.  male admitted to the ICU from the emergency department with influenza a and respiratory failure. History is obtained predominantly from Dr. Rosina Adkins and from the patient's daughter.  He currently is in a nursing home along with his wife who herself has severe dementia. Although the patient is somewhat physically limited by a previous hip fracture, the daughter states that his mentation has been excellent. However, the nursing home called her in approximately 10 this morning does state that he \"was not acting right. \" When the daughter got there is somewhat later, there was obvious \"rattling\" in his chest. He was transported here and now is minimally responsive. He is a DNR status, and this is maintained upon admission. Past Medical History/Comorbidities:   Mr. Marzena Hernadez  has a past medical history of Femoral neck fracture ([facility] (08/2015). He also has no past medical history of Difficult intubation; Malignant hyperthermia due to anesthesia; Nausea & vomiting; Pseudocholinesterase deficiency; or Unspecified adverse effect of anesthesia. Mr. Marzena Hernadez  has a past surgical history that includes appendectomy; hemorrhoidectomy; cataract removal (Bilateral); hip fracture tx (Right, 2015); and rotator cuff repair (Right, 2012).   Social History/Living Environment:   Home Environment: [address] [place] [address] Name: [address] [place] [address]  One/Two Story Residence: One story  Living Alone: No  Support Systems: Skilled nursing facility, Family member(s)  Patient Expects to be Discharged to[de-identified] Skilled nursing facility  Current DME Used/Available at Home: Wheelchair  Prior Level of Function/Work/Activity:  W/c bound, transfers independently  Current Medications:           Current Facility-Administered Medications:     dextrose (D50W) injection syrg 25 g, 25 g, IntraVENous, PRN, Garvin Bamberger., MD, 25 g at 01/11/17 0034    insulin NPH (NOVOLIN N, HUMULIN N) injection 24 Units, 24 Units, SubCUTAneous, ACBAnyi NP, Stopped at 01/11/17 0730    insulin lispro (HUMALOG) injection 5 Units, 5 Units, SubCUTAneous, TIDAAnyi GREENE NP, Stopped at 01/11/17 0730    oseltamivir (TAMIFLU) capsule 30 mg, 30 mg, Oral, BID, Saleem Carton Parth Andrade MD, 30 mg at 01/11/17 0815    dilTIAZem CD (CARDIZEM CD) capsule 240 mg, 240 mg, Oral, DAILY, Dianne Wang MD, 240 mg at 01/11/17 0815    risperiDONE (RisperDAL) tablet 0.5 mg, 0.5 mg, Oral, BID, Giovanny Yadav MD, 0.5 mg at 01/11/17 0815    acetaminophen (TYLENOL) tablet 500 mg, 500 mg, Oral, Q4H PRN, Jair Garcia MD    aspirin (ASPIRIN) tablet 325 mg, 325 mg, Oral, 7am, Jair Garcia MD, 325 mg at 01/11/17 0533    citalopram (CELEXA) tablet 10 mg, 10 mg, Oral, DAILY, Jair Garcia MD, 10 mg at 01/11/17 0815    magnesium oxide (MAG-OX) tablet 200 mg, 200 mg, Oral, DAILY, Jair Garcia MD, 200 mg at 01/11/17 0816    pantoprazole (PROTONIX) tablet 40 mg, 40 mg, Oral, ACB, Jair Garcia MD, 40 mg at 01/11/17 0535    nateglinide (STARLIX) tablet 60 mg, 60 mg, Oral, BID, Jair Garcia MD, 60 mg at 01/11/17 9679    tamsulosin (FLOMAX) capsule 0.4 mg, 0.4 mg, Oral, DAILY, Jair Garcia MD, 0.4 mg at 01/11/17 0815    traMADol (ULTRAM) tablet 50 mg, 50 mg, Oral, Q6H PRN, Jair Garcia MD    insulin regular (Peter Roberto R, HUMULIN R) injection, , SubCUTAneous, Q6H, Jair Garcia MD, Stopped at 01/09/17 2317    sodium chloride (NS) flush 5-10 mL, 5-10 mL, IntraVENous, Q8H, Jair Garcia MD, 5 mL at 01/11/17 0534    sodium chloride (NS) flush 5-10 mL, 5-10 mL, IntraVENous, PRN, Jair Garcia MD    albuterol (PROVENTIL VENTOLIN) nebulizer solution 2.5 mg, 2.5 mg, Nebulization, Q4H RT, Jair Garcia MD, 2.5 mg at 01/11/17 1121    heparin (porcine) injection 5,000 Units, 5,000 Units, SubCUTAneous, Q8H, Jair Garcia MD, 5,000 Units at 01/11/17 0534    guaiFENesin SR (MUCINEX) tablet 1,200 mg, 1,200 mg, Oral, BID, Marcellus Aguilar MD, 1,200 mg at 01/11/17 0815    haloperidol lactate (HALDOL) injection 5 mg, 5 mg, IntraVENous, Q8H PRN, Dee Dee Membreno MD, 5 mg at 01/09/17 2141   Date Last Reviewed:  2017   Number of Personal Factors/Comorbidities that affect the Plan of Care: 1-2: MODERATE COMPLEXITY   EXAMINATION:   Most Recent Physical Functioning:   Gross Assessment:  AROM: Generally decreased, functional  Strength: Generally decreased, functional               Posture:  Posture (WDL): Exceptions to WDL  Posture Assessment: Forward head, Rounded shoulders  Balance:  Sitting: Intact  Standing: Impaired; With support  Standing - Static: Fair  Standing - Dynamic : Fair Bed Mobility:  Rolling: Supervision  Supine to Sit: Supervision  Sit to Supine: Supervision  Wheelchair Mobility:     Transfers:  Sit to Stand: Supervision;Contact guard assistance  Stand to Sit: Supervision;Contact guard assistance  Gait:     Speed/Susana: Slow;Shuffled  Step Length: Right shortened;Left shortened  Distance (ft): 200 Feet (ft)  Assistive Device: Walker, rolling; Other (comment)  Ambulation - Level of Assistance: Contact guard assistance  Interventions: Verbal cues; Safety awareness training; Tactile cues;Manual cues       Body Structures Involved:  1. None Body Functions Affected:  1. None Activities and Participation Affected:  1. None   Number of elements that affect the Plan of Care: 1-2: LOW COMPLEXITY   CLINICAL PRESENTATION:   Presentation: Stable and uncomplicated: LOW COMPLEXITY   CLINICAL DECISION MAKIN [place] [id number] AM-PAC 6 Clicks   Basic Mobility Inpatient Short Form  How much difficulty does the patient currently have. .. Unable A Lot A Little None   1. Turning over in bed (including adjusting bedclothes, sheets and blankets)? [ ] 1   [ ] 2   [X] 3   [ ] 4   2. Sitting down on and standing up from a chair with arms ( e.g., wheelchair, bedside commode, etc.)   [ ] 1   [ ] 2   [X] 3   [ ] 4   3. Moving from lying on back to sitting on the side of the bed? [ ] 1   [ ] 2   [X] 3   [ ] 4   How much help from another person does the patient currently need. ..  Total A Lot A Little None 4. Moving to and from a bed to a chair (including a wheelchair)? [ ] 1   [ ] 2   [X] 3   [ ] 4   5. Need to walk in hospital room? [ ] 1   [ ] 2   [X] 3   [ ] 4   6. Climbing 3-5 steps with a railing? [ ] 1   [X] 2   [ ] 3   [ ] 4   © 2007, Trustees of [address] [place] [address] [employer], under license to [employer]. All rights reserved          Score:  Initial:17  Most Recent: X (Date: -- )   Interpretation of Tool:  Represents activities that are increasingly more difficult (i.e. Bed mobility, Transfers, Gait). Score 24 23 22-20 19-15 14-10 9-7 6       Modifier CH CI CJ CK CL CM CN         · Mobility - Walking and Moving Around:               - CURRENT STATUS:    CK - 40%-59% impaired, limited or restricted               - GOAL STATUS:           CK - 40%-59% impaired, limited or restricted               - D/C STATUS:                       CK - 40%-59% impaired, limited or restricted  [place] / Product Type: Medicare /       Medical Necessity:     · None. Reason for Services/Other Comments:  · NA. Use of outcome tool(s) and clinical judgement create a POC that gives a: Clear prediction of patient's progress: LOW COMPLEXITY                 TREATMENT:   (In addition to Assessment/Re-Assessment sessions the following treatments were rendered)  Pre-treatment Symptoms/Complaints:  none  Pain: Initial:   Pain Intensity 1: 0  Post Session:  0/10      Assessment/Reassessment only, no treatment provided today        Treatment/Session Assessment:    · Response to Treatment:  good.   · Interdisciplinary Collaboration:  · Registered Nurse  · Physician  ·   · After treatment position/precautions:  · Supine in bed  · Call light within reach  · RN notified  · Compliance with Program/Exercises: NA.  · Recommendations/Intent for next treatment session:  DC PT  Total Treatment Duration:  PT Patient Time In/Time Out  Time In: 1330  Time Out: [address] [address] María, PT

## 2017-01-11 NOTE — PROGRESS NOTES
Pt discharged back to [facility] via [employer]. SW called pt's daughter to inform. Report line and room number were given to RN. Faxed DC Summary to facility.

## 2017-01-11 NOTE — PROGRESS NOTES
Blood sugar was 48. Gave juice and crackers and rechecked 20 minutes later. Pt BS was 63. Pushed D50 injection per protocol. Will recheck in 30 minutes.

## 2017-01-11 NOTE — PROGRESS NOTES
Family wants to know if rehab is an option. I'm not sure if he meets criteria for rehab. Will have PT evaluate- appreciate their input.   Lisa Bardales MD

## 2017-01-11 NOTE — PROGRESS NOTES
Report called to [facility] to [address] [facility] RAFAEL [place]. Time given for questions and answers. Name and number left is questions arise. Transport to .

## 2017-01-12 ENCOUNTER — PATIENT OUTREACH (OUTPATIENT)
Dept: CASE MANAGEMENT | Age: 82
End: 2017-01-12

## 2017-01-12 PROBLEM — R55 SYNCOPE: Status: ACTIVE | Noted: 2017-01-12

## 2017-01-13 LAB
BACTERIA SPEC CULT: NORMAL
BACTERIA SPEC CULT: NORMAL
SERVICE CMNT-IMP: NORMAL
SERVICE CMNT-IMP: NORMAL

## 2017-01-13 NOTE — PROGRESS NOTES
Per chart review patient is permanently a resident at [facility] for [address]. Patient was transported by [employer]. No further HOLLIE needs due to permament placement. This note will not be viewable in [address]

## 2017-12-07 ENCOUNTER — HOSPITAL ENCOUNTER (OUTPATIENT)
Dept: LAB | Age: 82
Discharge: HOME | End: 2017-12-07

## 2017-12-07 LAB
ANION GAP SERPL CALC-SCNC: 13 MMOL/L (ref 7–16)
BASOPHILS # BLD: 0 K/UL (ref 0–0.2)
BASOPHILS NFR BLD: 0 % (ref 0–2)
BUN SERPL-MCNC: 22 MG/DL (ref 8–23)
CALCIUM SERPL-MCNC: 9.6 MG/DL (ref 8.3–10.4)
CHLORIDE SERPL-SCNC: 97 MMOL/L (ref 98–107)
CO2 SERPL-SCNC: 23 MMOL/L (ref 21–32)
CREAT SERPL-MCNC: 2 MG/DL (ref 0.8–1.5)
D DIMER PPP FEU-MCNC: 0.54 UG/ML(FEU)
DIFFERENTIAL METHOD BLD: ABNORMAL
EOSINOPHIL # BLD: 0 K/UL (ref 0–0.8)
EOSINOPHIL NFR BLD: 0 % (ref 0.5–7.8)
ERYTHROCYTE [DISTWIDTH] IN BLOOD BY AUTOMATED COUNT: 14.6 % (ref 11.9–14.6)
GLUCOSE SERPL-MCNC: 212 MG/DL (ref 65–100)
HCT VFR BLD AUTO: 36.3 % (ref 41.1–50.3)
HGB BLD-MCNC: 12 G/DL (ref 13.6–17.2)
IMM GRANULOCYTES # BLD: 0 K/UL (ref 0–0.5)
IMM GRANULOCYTES NFR BLD AUTO: 0 % (ref 0–5)
LYMPHOCYTES # BLD: 1.3 K/UL (ref 0.5–4.6)
LYMPHOCYTES NFR BLD: 10 % (ref 13–44)
MCH RBC QN AUTO: 27.8 PG (ref 26.1–32.9)
MCHC RBC AUTO-ENTMCNC: 33.1 G/DL (ref 31.4–35)
MCV RBC AUTO: 84.2 FL (ref 79.6–97.8)
MONOCYTES # BLD: 1 K/UL (ref 0.1–1.3)
MONOCYTES NFR BLD: 8 % (ref 4–12)
NEUTS SEG # BLD: 10.9 K/UL (ref 1.7–8.2)
NEUTS SEG NFR BLD: 82 % (ref 43–78)
PLATELET # BLD AUTO: 269 K/UL (ref 150–450)
PMV BLD AUTO: 9.2 FL (ref 10.8–14.1)
POTASSIUM SERPL-SCNC: 4.6 MMOL/L (ref 3.5–5.1)
RBC # BLD AUTO: 4.31 M/UL (ref 4.23–5.67)
SODIUM SERPL-SCNC: 133 MMOL/L (ref 136–145)
WBC # BLD AUTO: 13.2 K/UL (ref 4.3–11.1)

## 2017-12-07 PROCEDURE — 80048 BASIC METABOLIC PNL TOTAL CA: CPT | Performed by: FAMILY MEDICINE

## 2017-12-07 PROCEDURE — 85379 FIBRIN DEGRADATION QUANT: CPT | Performed by: FAMILY MEDICINE

## 2017-12-07 PROCEDURE — 85025 COMPLETE CBC W/AUTO DIFF WBC: CPT | Performed by: FAMILY MEDICINE

## 2022-12-20 NOTE — PROGRESS NOTES
TRANSFER - OUT REPORT:    Verbal report given to RADHA Cope(name) on Hitchita Osier  being transferred to 830(unit) for routine progression of care       Report consisted of patients Situation, Background, Assessment and   Recommendations(SBAR). Information from the following report(s) SBAR, Kardex, ED Summary, [facility] and Recent Results was reviewed with the receiving nurse. Lines:   Peripheral IV 01/09/17 Right; Lower Forearm (Active)   Site Assessment Clean, dry, & intact 1/10/2017 12:29 PM   Phlebitis Assessment 0 1/10/2017 12:29 PM   Infiltration Assessment 0 1/10/2017 12:29 PM   Dressing Status Clean, dry, & intact 1/10/2017 12:29 PM   Dressing Type Tape;Transparent 1/10/2017 12:29 PM   Hub Color/Line Status Flushed 1/10/2017 12:29 PM   Action Taken Blood drawn 1/9/2017  3:31 AM   Alcohol Cap Used No 1/9/2017  7:01 AM        Opportunity for questions and clarification was provided.       Patient transported with: no